# Patient Record
Sex: FEMALE | Race: WHITE | Employment: UNEMPLOYED | URBAN - METROPOLITAN AREA
[De-identification: names, ages, dates, MRNs, and addresses within clinical notes are randomized per-mention and may not be internally consistent; named-entity substitution may affect disease eponyms.]

---

## 2018-11-22 ENCOUNTER — HOSPITAL ENCOUNTER (INPATIENT)
Age: 58
LOS: 8 days | Discharge: HOME HEALTH | DRG: 133 | End: 2018-11-30
Attending: FAMILY MEDICINE | Admitting: FAMILY MEDICINE
Payer: COMMERCIAL

## 2018-11-22 DIAGNOSIS — R13.14 PHARYNGOESOPHAGEAL DYSPHAGIA: Primary | ICD-10-CM

## 2018-11-22 PROBLEM — J36 PERITONSILLAR ABSCESS: Status: ACTIVE | Noted: 2018-11-22

## 2018-11-22 LAB
ANION GAP SERPL CALCULATED.3IONS-SCNC: 13 MMOL/L (ref 9–17)
BUN BLDV-MCNC: 9 MG/DL (ref 6–20)
BUN/CREAT BLD: ABNORMAL (ref 9–20)
CALCIUM SERPL-MCNC: 9 MG/DL (ref 8.6–10.4)
CHLORIDE BLD-SCNC: 99 MMOL/L (ref 98–107)
CO2: 22 MMOL/L (ref 20–31)
CREAT SERPL-MCNC: 0.31 MG/DL (ref 0.5–0.9)
GFR AFRICAN AMERICAN: >60 ML/MIN
GFR NON-AFRICAN AMERICAN: >60 ML/MIN
GFR SERPL CREATININE-BSD FRML MDRD: ABNORMAL ML/MIN/{1.73_M2}
GFR SERPL CREATININE-BSD FRML MDRD: ABNORMAL ML/MIN/{1.73_M2}
GLUCOSE BLD-MCNC: 186 MG/DL (ref 70–99)
LACTIC ACID, SEPSIS WHOLE BLOOD: 1.1 MMOL/L (ref 0.5–1.9)
LACTIC ACID, SEPSIS: NORMAL MMOL/L (ref 0.5–1.9)
POTASSIUM SERPL-SCNC: 3.8 MMOL/L (ref 3.7–5.3)
SODIUM BLD-SCNC: 134 MMOL/L (ref 135–144)
TROPONIN INTERP: NORMAL
TROPONIN T: <0.03 NG/ML

## 2018-11-22 PROCEDURE — 84484 ASSAY OF TROPONIN QUANT: CPT

## 2018-11-22 PROCEDURE — 36415 COLL VENOUS BLD VENIPUNCTURE: CPT

## 2018-11-22 PROCEDURE — 2580000003 HC RX 258: Performed by: FAMILY MEDICINE

## 2018-11-22 PROCEDURE — 87040 BLOOD CULTURE FOR BACTERIA: CPT

## 2018-11-22 PROCEDURE — 2060000000 HC ICU INTERMEDIATE R&B

## 2018-11-22 PROCEDURE — 83605 ASSAY OF LACTIC ACID: CPT

## 2018-11-22 PROCEDURE — 99223 1ST HOSP IP/OBS HIGH 75: CPT | Performed by: FAMILY MEDICINE

## 2018-11-22 PROCEDURE — 80048 BASIC METABOLIC PNL TOTAL CA: CPT

## 2018-11-22 PROCEDURE — 87081 CULTURE SCREEN ONLY: CPT

## 2018-11-22 PROCEDURE — 6360000002 HC RX W HCPCS: Performed by: FAMILY MEDICINE

## 2018-11-22 RX ORDER — SODIUM CHLORIDE 9 MG/ML
INJECTION, SOLUTION INTRAVENOUS CONTINUOUS
Status: DISCONTINUED | OUTPATIENT
Start: 2018-11-22 | End: 2018-11-26

## 2018-11-22 RX ORDER — MORPHINE SULFATE 2 MG/ML
2 INJECTION, SOLUTION INTRAMUSCULAR; INTRAVENOUS EVERY 4 HOURS PRN
Status: DISCONTINUED | OUTPATIENT
Start: 2018-11-22 | End: 2018-11-29

## 2018-11-22 RX ORDER — SODIUM CHLORIDE 0.9 % (FLUSH) 0.9 %
10 SYRINGE (ML) INJECTION EVERY 12 HOURS SCHEDULED
Status: DISCONTINUED | OUTPATIENT
Start: 2018-11-22 | End: 2018-11-30 | Stop reason: HOSPADM

## 2018-11-22 RX ORDER — VANCOMYCIN HYDROCHLORIDE 500 MG/100ML
500 INJECTION, SOLUTION INTRAVENOUS EVERY 12 HOURS
Status: DISCONTINUED | OUTPATIENT
Start: 2018-11-22 | End: 2018-11-23

## 2018-11-22 RX ORDER — SODIUM CHLORIDE 0.9 % (FLUSH) 0.9 %
10 SYRINGE (ML) INJECTION PRN
Status: DISCONTINUED | OUTPATIENT
Start: 2018-11-22 | End: 2018-11-30 | Stop reason: HOSPADM

## 2018-11-22 RX ORDER — DEXAMETHASONE SODIUM PHOSPHATE 4 MG/ML
4 INJECTION, SOLUTION INTRA-ARTICULAR; INTRALESIONAL; INTRAMUSCULAR; INTRAVENOUS; SOFT TISSUE EVERY 6 HOURS
Status: DISCONTINUED | OUTPATIENT
Start: 2018-11-22 | End: 2018-11-23

## 2018-11-22 RX ORDER — ACETAMINOPHEN 325 MG/1
650 TABLET ORAL EVERY 4 HOURS PRN
Status: DISCONTINUED | OUTPATIENT
Start: 2018-11-22 | End: 2018-11-24

## 2018-11-22 RX ORDER — OXYCODONE HCL 5 MG/5 ML
5 SOLUTION, ORAL ORAL EVERY 4 HOURS PRN
Status: DISCONTINUED | OUTPATIENT
Start: 2018-11-22 | End: 2018-11-24

## 2018-11-22 RX ADMIN — DEXAMETHASONE SODIUM PHOSPHATE 4 MG: 4 INJECTION, SOLUTION INTRAMUSCULAR; INTRAVENOUS at 18:57

## 2018-11-22 RX ADMIN — MORPHINE SULFATE 2 MG: 2 INJECTION, SOLUTION INTRAMUSCULAR; INTRAVENOUS at 18:56

## 2018-11-22 RX ADMIN — VANCOMYCIN HYDROCHLORIDE 500 MG: 500 INJECTION, POWDER, LYOPHILIZED, FOR SOLUTION INTRAVENOUS at 23:25

## 2018-11-22 RX ADMIN — ENOXAPARIN SODIUM 40 MG: 40 INJECTION SUBCUTANEOUS at 18:58

## 2018-11-22 RX ADMIN — Medication 10 ML: at 20:21

## 2018-11-22 RX ADMIN — PIPERACILLIN AND TAZOBACTAM 3.38 G: 3; .375 INJECTION, POWDER, LYOPHILIZED, FOR SOLUTION INTRAVENOUS; PARENTERAL at 20:22

## 2018-11-22 RX ADMIN — SODIUM CHLORIDE: 9 INJECTION, SOLUTION INTRAVENOUS at 18:50

## 2018-11-22 ASSESSMENT — ENCOUNTER SYMPTOMS
SHORTNESS OF BREATH: 0
DIARRHEA: 0
BLOOD IN STOOL: 0
SINUS PRESSURE: 0
WHEEZING: 0
VOMITING: 0
NAUSEA: 0
CONSTIPATION: 0
VOICE CHANGE: 1
TROUBLE SWALLOWING: 1
ABDOMINAL PAIN: 0
SORE THROAT: 1
COUGH: 0

## 2018-11-22 ASSESSMENT — PAIN SCALES - GENERAL: PAINLEVEL_OUTOF10: 10

## 2018-11-23 ENCOUNTER — APPOINTMENT (OUTPATIENT)
Dept: GENERAL RADIOLOGY | Age: 58
DRG: 133 | End: 2018-11-23
Attending: FAMILY MEDICINE
Payer: COMMERCIAL

## 2018-11-23 ENCOUNTER — APPOINTMENT (OUTPATIENT)
Dept: CT IMAGING | Age: 58
DRG: 133 | End: 2018-11-23
Attending: FAMILY MEDICINE
Payer: COMMERCIAL

## 2018-11-23 LAB
ALBUMIN SERPL-MCNC: 3.1 G/DL (ref 3.5–5.2)
ALBUMIN/GLOBULIN RATIO: 0.9 (ref 1–2.5)
ALP BLD-CCNC: 59 U/L (ref 35–104)
ALT SERPL-CCNC: 13 U/L (ref 5–33)
ANION GAP SERPL CALCULATED.3IONS-SCNC: 14 MMOL/L (ref 9–17)
AST SERPL-CCNC: 13 U/L
BILIRUB SERPL-MCNC: 0.83 MG/DL (ref 0.3–1.2)
BUN BLDV-MCNC: 12 MG/DL (ref 6–20)
BUN/CREAT BLD: ABNORMAL (ref 9–20)
C-REACTIVE PROTEIN: 146.5 MG/L (ref 0–5)
CALCIUM IONIZED: 1.19 MMOL/L (ref 1.13–1.33)
CALCIUM SERPL-MCNC: 9.1 MG/DL (ref 8.6–10.4)
CHLORIDE BLD-SCNC: 104 MMOL/L (ref 98–107)
CO2: 22 MMOL/L (ref 20–31)
CREAT SERPL-MCNC: 0.32 MG/DL (ref 0.5–0.9)
EKG ATRIAL RATE: 102 BPM
EKG P AXIS: 82 DEGREES
EKG P-R INTERVAL: 138 MS
EKG Q-T INTERVAL: 330 MS
EKG QRS DURATION: 84 MS
EKG QTC CALCULATION (BAZETT): 430 MS
EKG R AXIS: 75 DEGREES
EKG T AXIS: 79 DEGREES
EKG VENTRICULAR RATE: 102 BPM
ESTIMATED AVERAGE GLUCOSE: 94 MG/DL
GFR AFRICAN AMERICAN: >60 ML/MIN
GFR NON-AFRICAN AMERICAN: >60 ML/MIN
GFR SERPL CREATININE-BSD FRML MDRD: ABNORMAL ML/MIN/{1.73_M2}
GFR SERPL CREATININE-BSD FRML MDRD: ABNORMAL ML/MIN/{1.73_M2}
GLUCOSE BLD-MCNC: 170 MG/DL (ref 70–99)
HBA1C MFR BLD: 4.9 % (ref 4–6)
HCT VFR BLD CALC: 38.5 % (ref 36.3–47.1)
HEMOGLOBIN: 12.7 G/DL (ref 11.9–15.1)
INR BLD: 0.9
MAGNESIUM: 2.2 MG/DL (ref 1.6–2.6)
MCH RBC QN AUTO: 33 PG (ref 25.2–33.5)
MCHC RBC AUTO-ENTMCNC: 33 G/DL (ref 28.4–34.8)
MCV RBC AUTO: 100 FL (ref 82.6–102.9)
NRBC AUTOMATED: 0 PER 100 WBC
PDW BLD-RTO: 11.8 % (ref 11.8–14.4)
PHOSPHORUS: 2.7 MG/DL (ref 2.6–4.5)
PLATELET # BLD: 202 K/UL (ref 138–453)
PMV BLD AUTO: 10.9 FL (ref 8.1–13.5)
POTASSIUM SERPL-SCNC: 3.7 MMOL/L (ref 3.7–5.3)
PROTHROMBIN TIME: 9.4 SEC (ref 9–12)
RBC # BLD: 3.85 M/UL (ref 3.95–5.11)
SODIUM BLD-SCNC: 140 MMOL/L (ref 135–144)
TOTAL PROTEIN: 6.5 G/DL (ref 6.4–8.3)
TROPONIN INTERP: NORMAL
TROPONIN T: <0.03 NG/ML
WBC # BLD: 15.7 K/UL (ref 3.5–11.3)

## 2018-11-23 PROCEDURE — 70491 CT SOFT TISSUE NECK W/DYE: CPT

## 2018-11-23 PROCEDURE — 71045 X-RAY EXAM CHEST 1 VIEW: CPT

## 2018-11-23 PROCEDURE — 2060000000 HC ICU INTERMEDIATE R&B

## 2018-11-23 PROCEDURE — 86140 C-REACTIVE PROTEIN: CPT

## 2018-11-23 PROCEDURE — 6360000004 HC RX CONTRAST MEDICATION: Performed by: FAMILY MEDICINE

## 2018-11-23 PROCEDURE — 83735 ASSAY OF MAGNESIUM: CPT

## 2018-11-23 PROCEDURE — 85610 PROTHROMBIN TIME: CPT

## 2018-11-23 PROCEDURE — 6360000002 HC RX W HCPCS: Performed by: OTOLARYNGOLOGY

## 2018-11-23 PROCEDURE — 6360000002 HC RX W HCPCS: Performed by: FAMILY MEDICINE

## 2018-11-23 PROCEDURE — 99254 IP/OBS CNSLTJ NEW/EST MOD 60: CPT | Performed by: INTERNAL MEDICINE

## 2018-11-23 PROCEDURE — 84100 ASSAY OF PHOSPHORUS: CPT

## 2018-11-23 PROCEDURE — 83036 HEMOGLOBIN GLYCOSYLATED A1C: CPT

## 2018-11-23 PROCEDURE — 93005 ELECTROCARDIOGRAM TRACING: CPT

## 2018-11-23 PROCEDURE — 82330 ASSAY OF CALCIUM: CPT

## 2018-11-23 PROCEDURE — 80053 COMPREHEN METABOLIC PANEL: CPT

## 2018-11-23 PROCEDURE — 84484 ASSAY OF TROPONIN QUANT: CPT

## 2018-11-23 PROCEDURE — 99232 SBSQ HOSP IP/OBS MODERATE 35: CPT | Performed by: FAMILY MEDICINE

## 2018-11-23 PROCEDURE — 2580000003 HC RX 258: Performed by: FAMILY MEDICINE

## 2018-11-23 PROCEDURE — 85027 COMPLETE CBC AUTOMATED: CPT

## 2018-11-23 PROCEDURE — 36415 COLL VENOUS BLD VENIPUNCTURE: CPT

## 2018-11-23 RX ORDER — DEXAMETHASONE SODIUM PHOSPHATE 10 MG/ML
10 INJECTION INTRAMUSCULAR; INTRAVENOUS EVERY 8 HOURS
Status: DISCONTINUED | OUTPATIENT
Start: 2018-11-23 | End: 2018-11-26

## 2018-11-23 RX ADMIN — SODIUM CHLORIDE: 9 INJECTION, SOLUTION INTRAVENOUS at 22:07

## 2018-11-23 RX ADMIN — Medication 10 MG: at 17:04

## 2018-11-23 RX ADMIN — Medication 10 MG: at 06:03

## 2018-11-23 RX ADMIN — DEXAMETHASONE SODIUM PHOSPHATE 4 MG: 4 INJECTION, SOLUTION INTRAMUSCULAR; INTRAVENOUS at 01:34

## 2018-11-23 RX ADMIN — Medication 10 ML: at 20:09

## 2018-11-23 RX ADMIN — IOPAMIDOL 75 ML: 755 INJECTION, SOLUTION INTRAVENOUS at 21:55

## 2018-11-23 RX ADMIN — MORPHINE SULFATE 2 MG: 2 INJECTION, SOLUTION INTRAMUSCULAR; INTRAVENOUS at 11:56

## 2018-11-23 RX ADMIN — Medication 10 ML: at 08:55

## 2018-11-23 RX ADMIN — PIPERACILLIN AND TAZOBACTAM 3.38 G: 3; .375 INJECTION, POWDER, LYOPHILIZED, FOR SOLUTION INTRAVENOUS; PARENTERAL at 20:09

## 2018-11-23 RX ADMIN — Medication 10 MG: at 20:18

## 2018-11-23 RX ADMIN — PIPERACILLIN AND TAZOBACTAM 3.38 G: 3; .375 INJECTION, POWDER, LYOPHILIZED, FOR SOLUTION INTRAVENOUS; PARENTERAL at 03:49

## 2018-11-23 RX ADMIN — VANCOMYCIN HYDROCHLORIDE 500 MG: 500 INJECTION, POWDER, LYOPHILIZED, FOR SOLUTION INTRAVENOUS at 13:17

## 2018-11-23 RX ADMIN — PIPERACILLIN AND TAZOBACTAM 3.38 G: 3; .375 INJECTION, POWDER, LYOPHILIZED, FOR SOLUTION INTRAVENOUS; PARENTERAL at 11:53

## 2018-11-23 ASSESSMENT — ENCOUNTER SYMPTOMS
CONSTIPATION: 0
VOICE CHANGE: 1
ABDOMINAL PAIN: 0
WHEEZING: 0
EYES NEGATIVE: 1
NAUSEA: 0
RESPIRATORY NEGATIVE: 1
SHORTNESS OF BREATH: 0
VOMITING: 0
BLOOD IN STOOL: 0
DIARRHEA: 0
ALLERGIC/IMMUNOLOGIC NEGATIVE: 1
SINUS PRESSURE: 0
SORE THROAT: 1
GASTROINTESTINAL NEGATIVE: 1
COUGH: 0
TROUBLE SWALLOWING: 1

## 2018-11-23 ASSESSMENT — PAIN SCALES - GENERAL: PAINLEVEL_OUTOF10: 10

## 2018-11-23 NOTE — CONSULTS
[address]                  [facility]                                  CONSULTATION    PATIENT NAME: Shwetha Franco                 :        1960  MED REC NO:   [id number]                             ROOM:       9691  ACCOUNT NO:   [de-identified]                           ADMIT DATE: 2018  PROVIDER:     Abhilash Media    CONSULT DATE:  2018    LOCATION:  431, bed-1. HISTORY OF PRESENT ILLNESS:  This is a 70-year-old who was transferred  from [facility] one day prior to this dictation with a  suspected retropharyngeal abscess. The reviewers refer to the chart for definitive details. The patient admits to a now 12-day history of throat pain. She was  evaluated by an urgent care center. Throat swab at that time was  reportedly negative. Amoxicillin was prescribed. Unfortunately, throat  pain worsened to that include, dysphagia, odynophagia, voice changes  along with neck pain worsened with neck movement. The patient presented to [facility] one day prior to this  dictation. White blood cell count was elevated at 17.8. Strep screen  was negative. CT neck with contrast was obtained. No images were  available for review. This reported very extensive, very thick  prevertebral apparent abscess. Lower most extension was at the mid  thyroid. More fluid, however, was present inferiorly, adjacent to the  carotid arteries and lower thyroid gland anteriorly. The patient was transferred for definitive care. Zosyn and vancomycin  were initiated in addition to Decadron. Overall, the patient admits to  marked improvement in her swallow function, throat and neck pain. She  denies respiratory issues throughout her illness. PAST MEDICAL HISTORY:  None. PAST SURGICAL HISTORY:  None recorded. SOCIAL HISTORY:  She admits to drinking beer twice weekly. Tobacco  abuse.     HOME MEDICATIONS:  None. VITAL SIGNS:  Afebrile. Heart rate 100, respiratory rate 21, blood  pressure 152/59, O2 saturation 96% on room air, weight 102 pounds,  maximum temperature 99.0. Single-view chest x-ray, 11/23/2018, was unremarkable. PRESENT MEDICATIONS:  Include, but may not be limited to, Decadron,  Lovenox, Zosyn, vancomycin. BLOOD WORK:  11/23/2018, white blood cell count 15.7. Throat culture is  pending. Blood cultures x2 are no growth to date. PT/INR within normal  limits. PHYSICAL EXAMINATION:  Bedside exam was performed with the Department of  Nursing in attendance. This revealed a 53-year-old. She was in no  acute distress. Her voice was strong and intact, grossly unremarkable. There was no respiratory distress or stridor. She is unwilling/unable  to swallow her own secretions and requires frequent suctioning, spiting  into a tissue. Facial exam was unremarkable. Neck exam visually was  remarkable. Palpation of the neck was grossly unremarkable. The neck  was nontender with manipulation. Bilateral ear exam was unremarkable. Anterior rhinoscopy revealed septal deviation to the left. There was  mild pale mucosal edema with mucoid rhinorrhea, but without mass, polyp,  or mucopurulence. Oral exam revealed minimal trismus only. Tongue and  floor of mouth were unremarkable. There was significant pooling of  secretions in the oropharynx. There was suggestion of fullness about  the right posterior oropharynx. PROCEDURE:  Flexible laryngoscopy was performed at the bedside. The  patient was placed in the semirecumbent position. Department of Nursing  was in attendance. Verbal consent was obtained. Anterior rhinoscopy  revealed septal deviation to the left. A lubricated flexible  laryngoscope was easily passed in the right nasal airway. This revealed  mild-to-moderate pale mucosal edema with mild-to-moderate mucoid  rhinorrhea. The nasopharynx was grossly unremarkable. There was subtle  fullness about the right aspect. Oropharyngeal and hypopharyngeal exam  suggested fullness about the right aspect. Laryngeal exam was  unremarkable. There was no vocal cord anatomy and function. The  supraglottic larynx was unremarkable. There was no edema. The  laryngeal airway was widely patent. There was moderate pooling of clear  salivary secretions throughout the hypopharynx. The endoscope was then  slowly and gently removed. This completed the procedures. The patient  tolerated the procedure without undue discomfort or complication. IMPRESSION:  Apparent retropharyngeal fluid collection/abscess per  outside CT neck with contrast.  Unfortunately, outside images are not  available for review. Further, disease dimensions are not recorded in  the outside report. The patient does admit to improvement post admission with IV antibiotic  and limited Decadron therapy. Presently, she is in no acute distress. No urgent intervention is required. On Zosyn, vancomycin, recommend infectious disease consultation. We  will also increase Decadron to 10 mg per IV q.8 hours. Monitored bed is required with continuous pulse oximetry. Continue  n.p.o. status. If there is no significant continued improvement through the day, repeat  CT neck with contrast this evening will be required. The patient appeared to understand these plans and considerations. She  understands the potential need for incision and drainage in the  operating room setting under general anesthesia as directed by her  clinical course. All questions were answered. The above was discussed with the Department of Nursing. We will  continue to follow the patient closely with you. Please do not hesitate  to contact myself with any specific questions regarding the above.         Lolly Carter    D: 11/23/2018 11:00:52       T: 11/23/2018 12:18:12     [id number]  Job#: [id number]     Doc#: [id number]    CC:

## 2018-11-23 NOTE — CONSULTS
today is 15. We are consulted for management and antibiotics    Interval ofzstdk8311/23/2018   At this time. She has stiff neck, quite a bit of neck pain but improved compared to before. Continues to be sore to the throat, but her voice is back to normal.  She is hard to hear, which is her baseline. White count is 15, probably because of the dose of steroids given in the ER before her transfer to [facility]  Summary of relevant labs:  Labs:    Micro:    Imaging:      I have personally reviewed the past medical history, past surgical history, medications, social history, and family history, and I haveupdated the database accordingly. Past Medical History:   History reviewed. No pertinent past medical history. Past Surgical  History:   History reviewed. No pertinent surgical history. Medications:      dexamethasone  10 mg Intravenous Q8H    sodium chloride flush  10 mL Intravenous 2 times per day    piperacillin-tazobactam  3.375 g Intravenous Q8H    vancomycin  500 mg Intravenous Q12H    vancomycin (VANCOCIN) intermittent dosing (placeholder)   Other RX Placeholder       Social History:     Social History     Social History    Marital status:      Spouse name: N/A    Number of children: N/A    Years of education: N/A     Occupational History    Not on file. Social History Main Topics    Smoking status: Current Every Day Smoker    Smokeless tobacco: Never Used    Alcohol use 1.8 oz/week     3 Cans of beer per week    Drug use: No    Sexual activity: Not on file     Other Topics Concern    Not on file     Social History Narrative    No narrative on file       Family History:   No family history on file. Allergies:   Patient has no known allergies. Review of Systems:     Review of Systems   Constitutional: Negative. HENT: Positive for sore throat. Eyes: Negative. Respiratory: Negative. Cardiovascular: Negative. Gastrointestinal: Negative.     Endocrine:

## 2018-11-23 NOTE — PROGRESS NOTES
Physical Therapy  DATE: 2018    NAME: Dunia Cervantes  MRN: [id number]   : 1960    Patient not seen this date for Physical Therapy due to:  [] Blood transfusion in progress  [] Hemodialysis  []  Patient Declined  [] Spine Precautions   [x] Strict Bedrest - confirmed with RN per OT   [] Surgery/ Procedure  [] Testing      [] Other        [] PT being discontinued at this time. Patient independent. No further needs. [] PT being discontinued at this time as the patient has been transferred to palliative care. No further needs. ZAN Bustamante   Evaluation/treatment performed by Student PT under the supervision of co-signing PT who agrees with all evaluation/treatment and documentation.

## 2018-11-23 NOTE — CARE COORDINATION
Case Management Initial Discharge Plan  Trey Gutierrez,             Met with:patient to discuss discharge plans. Information verified: address, contacts, phone number, , insurance Yes  PCP: Ross Hannah DO  Date of last visit: years ago. Went to an urgent care for her current issue    Insurance Provider: Sue    Discharge Planning    Living Arrangements:  Spouse/Significant Other   Support Systems:  Spouse/Significant Other, Children    Home has 1 stories  0 stairs to climb to get into front door, n/a stairs to climb to reach second floor  Location of bedroom/bathroom in home main    Patient able to perform ADL's:Independent    Current Services (outpatient & in home) none  DME equipment: none  DME provider: none    Pharmacy: [facility]   Potential Assistance Purchasing Medications:  No  Does patient want to participate in local refill/ meds to beds program?  Yes    Potential Assistance Needed:  N/A    Patient agreeable to home care: No  [place] of choice provided:  n/a    Prior SNF/Rehab Placement and Facility: none  Agreeable to SNF/Rehab: No  [place] of choice provided: n/a   Evaluation: n/a    Expected Discharge date:  18  Patient expects to be discharged to:  home  Follow Up Appointment: Best Day/ Time: Monday AM    Transportation provider: spouse  Transportation arrangements needed for discharge: No    Readmission Risk              Risk of Unplanned Readmission:        8             Does patient have a readmission risk score greater than 14?: No  If yes, follow-up appointment must be made within 7 days of discharge.      Discharge Plan: home independent          Electronically signed by Ankita Barrientos RN on 18 at 11:27 AM

## 2018-11-23 NOTE — PROGRESS NOTES
Patient seen, examined, including bedside flexible laryngoscopy  Chart reviewed  Consult dictated    A/P: Apparent retropharyngeal fluid collection/abscess per outside CT neck with contrast            - unfortunately, outside images are not available for review and disease dimensions are not recorded in the outside report            -  Admits to improvement post-admission. Presently, in no distress                    no urgent intervention required            - on Zosyn, Vancomycin                    recommend ID consultation            - begin Decadron 10mg IV q8            - monitored bed, continuous pulse oximetry            - npo            - if no significant improvement through the day, will need repeat CT neck with contrast this evening            - Mrs. Barbra Ramirez appeared to understand these plans and recommendation. She understands the potential need for I&D in the OR under general anesthesia, as per her clinical course            - d/w Nursing. We will continue to follow Mrs. Barbra Ramirez closely with you.  Please call with any questions

## 2018-11-23 NOTE — PROGRESS NOTES
mg Intravenous Q8H   sodium chloride flush 10 mL Intravenous 2 times per day   enoxaparin 40 mg Subcutaneous Daily   piperacillin-tazobactam 3.375 g Intravenous Q8H   vancomycin 500 mg Intravenous Q12H   dexamethasone 4 mg Intravenous Q6H   vancomycin (VANCOCIN) intermittent dosing (placeholder)  Other RX Placeholder       Review of Systems   Review of Systems   Constitutional: Negative for activity change, appetite change, chills, fatigue, fever and unexpected weight change. HENT: Positive for sore throat, trouble swallowing and voice change. Negative for congestion, mouth sores, postnasal drip and sinus pressure. Eyes: Negative for visual disturbance. Respiratory: Negative for cough, shortness of breath and wheezing. Cardiovascular: Negative for chest pain and palpitations. Gastrointestinal: Negative for abdominal pain, blood in stool, constipation, diarrhea, nausea and vomiting. Endocrine: Negative for polyuria. Genitourinary: Negative for difficulty urinating, dysuria, frequency and urgency. Musculoskeletal: Negative for arthralgias, joint swelling and myalgias. Neurological: Negative for dizziness, tremors, speech difficulty, light-headedness and headaches.      Objective :      Current Vitals : Temp: 98.4 °F (36.9 °C),  Pulse: 112, Resp: 21, BP: (!) 125/55, SpO2: 99 %  Last 24 Hrs Vitals   Patient Vitals for the past 24 hrs:   BP Temp Temp src Pulse Resp SpO2 Height Weight   11/23/18 0600 - - - - - - - 102 lb 11.8 oz (46.6 kg)   11/23/18 0345 (!) 125/55 98.4 °F (36.9 °C) Oral 112 21 - - -   11/22/18 2330 (!) 110/51 99 °F (37.2 °C) Oral 95 28 - - -   11/22/18 1949 (!) 130/40 98.9 °F (37.2 °C) Oral 99 28 99 % - -   11/22/18 1830 - - - - - - 5' 5\" (1.651 m) 110 lb 0.2 oz (49.9 kg)   11/22/18 1745 (!) 130/46 97.4 °F (36.3 °C) Oral 122 18 98 % - -     Intake / output   11/22 0701 - 11/23 0700  In: 1137.2 [P.O.:30; I.V.:1107.2]  Out: -   Physical Exam:  Physical Exam   Constitutional: She is oriented to person, place, and time. She appears well-developed and well-nourished. No distress. HENT:   Mouth/Throat: No oropharyngeal exudate. Eyes: Pupils are equal, round, and reactive to light. Conjunctivae are normal. No scleral icterus. Neck: No JVD present. Muscular tenderness present. Edema present. No thyromegaly present. Cardiovascular: Normal rate, regular rhythm and normal heart sounds. Exam reveals no gallop and no friction rub. No murmur heard. Pulmonary/Chest: Effort normal. No respiratory distress. She has no wheezes. She has no rales. Abdominal: Soft. Bowel sounds are normal. She exhibits no distension and no mass. There is no tenderness. There is no rebound and no guarding. Lymphadenopathy:        Head (right side): Submandibular adenopathy present. She has no cervical adenopathy. Neurological: She is alert and oriented to person, place, and time. No cranial nerve deficit. She exhibits normal muscle tone. Skin: She is not diaphoretic. Nursing note and vitals reviewed. Lower Extremities : No ankle Edema , No calf Tenderness     Laboratory findings:    Recent Labs      11/23/18   0556   WBC  15.7*   HGB  12.7   HCT  38.5   PLT  202   INR  0.9     Recent Labs      11/22/18   2232  11/23/18   0556   NA  134*  140   K  3.8  3.7   CL  99  104   CO2  22  22   GLUCOSE  186*  170*   BUN  9  12   CREATININE  0.31*  0.32*   MG   --   2.2   CALCIUM  9.0  9.1   CAION   --   1.19   PHOS   --   2.7     Recent Labs      11/23/18   0556   PROT  6.5   LABALBU  3.1*   AST  13   ALT  13   ALKPHOS  59   BILITOT  0.83          No results found for: Clare Rodney, RBCUA, BLOODU, BACTERIA, NITRU, WBCUA, LEUKOCYTESUR    Imaging/Diagonstics:     CT neck 11/22/18   Very  extensive very thick prevertebral heparin abscess. Warm most extent of prevertebral material at the mid thyroid level or so.   Though more fluid is present in inferior to the edges sent to the carotid arteries and lower

## 2018-11-24 ENCOUNTER — APPOINTMENT (OUTPATIENT)
Dept: GENERAL RADIOLOGY | Age: 58
DRG: 133 | End: 2018-11-24
Attending: FAMILY MEDICINE
Payer: COMMERCIAL

## 2018-11-24 ENCOUNTER — ANESTHESIA EVENT (OUTPATIENT)
Dept: OPERATING ROOM | Age: 58
DRG: 133 | End: 2018-11-24
Payer: COMMERCIAL

## 2018-11-24 ENCOUNTER — ANESTHESIA (OUTPATIENT)
Dept: OPERATING ROOM | Age: 58
DRG: 133 | End: 2018-11-24
Payer: COMMERCIAL

## 2018-11-24 VITALS
DIASTOLIC BLOOD PRESSURE: 43 MMHG | TEMPERATURE: 89.4 F | OXYGEN SATURATION: 99 % | RESPIRATION RATE: 8 BRPM | SYSTOLIC BLOOD PRESSURE: 84 MMHG

## 2018-11-24 LAB
ABSOLUTE EOS #: 0 K/UL (ref 0–0.4)
ABSOLUTE IMMATURE GRANULOCYTE: 0 K/UL (ref 0–0.3)
ABSOLUTE LYMPH #: 0.78 K/UL (ref 1–4.8)
ABSOLUTE MONO #: 1.3 K/UL (ref 0.1–0.8)
ALLEN TEST: NEGATIVE
ANION GAP SERPL CALCULATED.3IONS-SCNC: 12 MMOL/L (ref 9–17)
BASOPHILS # BLD: 0 % (ref 0–2)
BASOPHILS ABSOLUTE: 0 K/UL (ref 0–0.2)
BUN BLDV-MCNC: 22 MG/DL (ref 6–20)
BUN/CREAT BLD: ABNORMAL (ref 9–20)
C-REACTIVE PROTEIN: 87.3 MG/L (ref 0–5)
CALCIUM SERPL-MCNC: 9 MG/DL (ref 8.6–10.4)
CHLORIDE BLD-SCNC: 109 MMOL/L (ref 98–107)
CO2: 20 MMOL/L (ref 20–31)
CREAT SERPL-MCNC: 0.39 MG/DL (ref 0.5–0.9)
CULTURE: NORMAL
CULTURE: NORMAL
DIFFERENTIAL TYPE: ABNORMAL
EOSINOPHILS RELATIVE PERCENT: 0 % (ref 1–4)
FIO2: 40
GFR AFRICAN AMERICAN: >60 ML/MIN
GFR NON-AFRICAN AMERICAN: >60 ML/MIN
GFR SERPL CREATININE-BSD FRML MDRD: ABNORMAL ML/MIN/{1.73_M2}
GFR SERPL CREATININE-BSD FRML MDRD: ABNORMAL ML/MIN/{1.73_M2}
GLUCOSE BLD-MCNC: 145 MG/DL (ref 65–105)
GLUCOSE BLD-MCNC: 153 MG/DL (ref 70–99)
GLUCOSE BLD-MCNC: 181 MG/DL (ref 65–105)
HCT VFR BLD CALC: 37.4 % (ref 36.3–47.1)
HEMOGLOBIN: 12.2 G/DL (ref 11.9–15.1)
IMMATURE GRANULOCYTES: 0 %
LACTIC ACID, WHOLE BLOOD: 1.8 MMOL/L (ref 0.7–2.1)
LYMPHOCYTES # BLD: 6 % (ref 24–44)
Lab: NORMAL
MCH RBC QN AUTO: 33.8 PG (ref 25.2–33.5)
MCHC RBC AUTO-ENTMCNC: 32.6 G/DL (ref 28.4–34.8)
MCV RBC AUTO: 103.6 FL (ref 82.6–102.9)
MODE: ABNORMAL
MONOCYTES # BLD: 10 % (ref 1–7)
MORPHOLOGY: ABNORMAL
NEGATIVE BASE EXCESS, ART: ABNORMAL (ref 0–2)
NRBC AUTOMATED: 0 PER 100 WBC
O2 DEVICE/FLOW/%: ABNORMAL
PATIENT TEMP: ABNORMAL
PDW BLD-RTO: 12 % (ref 11.8–14.4)
PLATELET # BLD: 217 K/UL (ref 138–453)
PLATELET ESTIMATE: ABNORMAL
PMV BLD AUTO: 11.7 FL (ref 8.1–13.5)
POC HCO3: 25.8 MMOL/L (ref 21–28)
POC O2 SATURATION: 98 % (ref 94–98)
POC PCO2 TEMP: ABNORMAL MM HG
POC PCO2: 42.9 MM HG (ref 35–48)
POC PH TEMP: ABNORMAL
POC PH: 7.39 (ref 7.35–7.45)
POC PO2 TEMP: ABNORMAL MM HG
POC PO2: 115.4 MM HG (ref 83–108)
POSITIVE BASE EXCESS, ART: 1 (ref 0–3)
POTASSIUM SERPL-SCNC: 4.4 MMOL/L (ref 3.7–5.3)
RBC # BLD: 3.61 M/UL (ref 3.95–5.11)
RBC # BLD: ABNORMAL 10*6/UL
SAMPLE SITE: ABNORMAL
SEG NEUTROPHILS: 84 % (ref 36–66)
SEGMENTED NEUTROPHILS ABSOLUTE COUNT: 10.92 K/UL (ref 1.8–7.7)
SODIUM BLD-SCNC: 141 MMOL/L (ref 135–144)
SPECIMEN DESCRIPTION: NORMAL
STATUS: NORMAL
TCO2 (CALC), ART: 27 MMOL/L (ref 22–29)
VANCOMYCIN TROUGH DATE LAST DOSE: ABNORMAL
VANCOMYCIN TROUGH DOSE AMOUNT: ABNORMAL
VANCOMYCIN TROUGH TIME LAST DOSE: ABNORMAL
VANCOMYCIN TROUGH: <4 UG/ML (ref 10–20)
WBC # BLD: 13 K/UL (ref 3.5–11.3)
WBC # BLD: ABNORMAL 10*3/UL

## 2018-11-24 PROCEDURE — 86140 C-REACTIVE PROTEIN: CPT

## 2018-11-24 PROCEDURE — 2580000003 HC RX 258: Performed by: OTOLARYNGOLOGY

## 2018-11-24 PROCEDURE — 94002 VENT MGMT INPAT INIT DAY: CPT

## 2018-11-24 PROCEDURE — 3600000014 HC SURGERY LEVEL 4 ADDTL 15MIN: Performed by: OTOLARYNGOLOGY

## 2018-11-24 PROCEDURE — 2700000000 HC OXYGEN THERAPY PER DAY

## 2018-11-24 PROCEDURE — 0C9M8ZZ DRAINAGE OF PHARYNX, VIA NATURAL OR ARTIFICIAL OPENING ENDOSCOPIC: ICD-10-PCS | Performed by: OTOLARYNGOLOGY

## 2018-11-24 PROCEDURE — 71046 X-RAY EXAM CHEST 2 VIEWS: CPT

## 2018-11-24 PROCEDURE — 87070 CULTURE OTHR SPECIMN AEROBIC: CPT

## 2018-11-24 PROCEDURE — 87077 CULTURE AEROBIC IDENTIFY: CPT

## 2018-11-24 PROCEDURE — 2000000000 HC ICU R&B

## 2018-11-24 PROCEDURE — 6370000000 HC RX 637 (ALT 250 FOR IP): Performed by: NURSE PRACTITIONER

## 2018-11-24 PROCEDURE — 87186 SC STD MICRODIL/AGAR DIL: CPT

## 2018-11-24 PROCEDURE — 2500000003 HC RX 250 WO HCPCS: Performed by: NURSE ANESTHETIST, CERTIFIED REGISTERED

## 2018-11-24 PROCEDURE — 6360000002 HC RX W HCPCS: Performed by: OTOLARYNGOLOGY

## 2018-11-24 PROCEDURE — 87205 SMEAR GRAM STAIN: CPT

## 2018-11-24 PROCEDURE — 94770 HC ETCO2 MONITOR DAILY: CPT

## 2018-11-24 PROCEDURE — 6360000002 HC RX W HCPCS: Performed by: INTERNAL MEDICINE

## 2018-11-24 PROCEDURE — 6370000000 HC RX 637 (ALT 250 FOR IP): Performed by: INTERNAL MEDICINE

## 2018-11-24 PROCEDURE — 83605 ASSAY OF LACTIC ACID: CPT

## 2018-11-24 PROCEDURE — 6360000002 HC RX W HCPCS: Performed by: FAMILY MEDICINE

## 2018-11-24 PROCEDURE — 2580000003 HC RX 258: Performed by: NURSE ANESTHETIST, CERTIFIED REGISTERED

## 2018-11-24 PROCEDURE — 2709999900 HC NON-CHARGEABLE SUPPLY: Performed by: OTOLARYNGOLOGY

## 2018-11-24 PROCEDURE — 80048 BASIC METABOLIC PNL TOTAL CA: CPT

## 2018-11-24 PROCEDURE — 3700000000 HC ANESTHESIA ATTENDED CARE: Performed by: OTOLARYNGOLOGY

## 2018-11-24 PROCEDURE — 6370000000 HC RX 637 (ALT 250 FOR IP): Performed by: OTOLARYNGOLOGY

## 2018-11-24 PROCEDURE — 94762 N-INVAS EAR/PLS OXIMTRY CONT: CPT

## 2018-11-24 PROCEDURE — 85025 COMPLETE CBC W/AUTO DIFF WBC: CPT

## 2018-11-24 PROCEDURE — 0CJS8ZZ INSPECTION OF LARYNX, VIA NATURAL OR ARTIFICIAL OPENING ENDOSCOPIC: ICD-10-PCS | Performed by: OTOLARYNGOLOGY

## 2018-11-24 PROCEDURE — 2580000003 HC RX 258: Performed by: INTERNAL MEDICINE

## 2018-11-24 PROCEDURE — 2580000003 HC RX 258: Performed by: FAMILY MEDICINE

## 2018-11-24 PROCEDURE — 71045 X-RAY EXAM CHEST 1 VIEW: CPT

## 2018-11-24 PROCEDURE — 2500000003 HC RX 250 WO HCPCS: Performed by: INTERNAL MEDICINE

## 2018-11-24 PROCEDURE — 82803 BLOOD GASES ANY COMBINATION: CPT

## 2018-11-24 PROCEDURE — 99232 SBSQ HOSP IP/OBS MODERATE 35: CPT | Performed by: INTERNAL MEDICINE

## 2018-11-24 PROCEDURE — 99232 SBSQ HOSP IP/OBS MODERATE 35: CPT | Performed by: FAMILY MEDICINE

## 2018-11-24 PROCEDURE — 87075 CULTR BACTERIA EXCEPT BLOOD: CPT

## 2018-11-24 PROCEDURE — 3700000001 HC ADD 15 MINUTES (ANESTHESIA): Performed by: OTOLARYNGOLOGY

## 2018-11-24 PROCEDURE — 80202 ASSAY OF VANCOMYCIN: CPT

## 2018-11-24 PROCEDURE — 82947 ASSAY GLUCOSE BLOOD QUANT: CPT

## 2018-11-24 PROCEDURE — 36415 COLL VENOUS BLD VENIPUNCTURE: CPT

## 2018-11-24 PROCEDURE — S0028 INJECTION, FAMOTIDINE, 20 MG: HCPCS | Performed by: INTERNAL MEDICINE

## 2018-11-24 PROCEDURE — 6360000002 HC RX W HCPCS: Performed by: NURSE ANESTHETIST, CERTIFIED REGISTERED

## 2018-11-24 PROCEDURE — 87641 MR-STAPH DNA AMP PROBE: CPT

## 2018-11-24 PROCEDURE — 94640 AIRWAY INHALATION TREATMENT: CPT

## 2018-11-24 PROCEDURE — 3600000004 HC SURGERY LEVEL 4 BASE: Performed by: OTOLARYNGOLOGY

## 2018-11-24 RX ORDER — DEXAMETHASONE SODIUM PHOSPHATE 10 MG/ML
INJECTION INTRAMUSCULAR; INTRAVENOUS PRN
Status: DISCONTINUED | OUTPATIENT
Start: 2018-11-24 | End: 2018-11-24 | Stop reason: SDUPTHER

## 2018-11-24 RX ORDER — MAGNESIUM HYDROXIDE 1200 MG/15ML
LIQUID ORAL CONTINUOUS PRN
Status: COMPLETED | OUTPATIENT
Start: 2018-11-24 | End: 2018-11-24

## 2018-11-24 RX ORDER — PROPOFOL 10 MG/ML
INJECTION, EMULSION INTRAVENOUS CONTINUOUS PRN
Status: DISCONTINUED | OUTPATIENT
Start: 2018-11-24 | End: 2018-11-24 | Stop reason: SDUPTHER

## 2018-11-24 RX ORDER — FENTANYL CITRATE 50 UG/ML
50 INJECTION, SOLUTION INTRAMUSCULAR; INTRAVENOUS EVERY 5 MIN PRN
Status: DISCONTINUED | OUTPATIENT
Start: 2018-11-24 | End: 2018-11-24 | Stop reason: HOSPADM

## 2018-11-24 RX ORDER — DEXTROSE MONOHYDRATE 25 G/50ML
12.5 INJECTION, SOLUTION INTRAVENOUS PRN
Status: DISCONTINUED | OUTPATIENT
Start: 2018-11-24 | End: 2018-11-30 | Stop reason: HOSPADM

## 2018-11-24 RX ORDER — PROPOFOL 10 MG/ML
10 INJECTION, EMULSION INTRAVENOUS
Status: DISCONTINUED | OUTPATIENT
Start: 2018-11-24 | End: 2018-11-25

## 2018-11-24 RX ORDER — PROPOFOL 10 MG/ML
INJECTION, EMULSION INTRAVENOUS PRN
Status: DISCONTINUED | OUTPATIENT
Start: 2018-11-24 | End: 2018-11-24 | Stop reason: SDUPTHER

## 2018-11-24 RX ORDER — FENTANYL CITRATE 50 UG/ML
25 INJECTION, SOLUTION INTRAMUSCULAR; INTRAVENOUS EVERY 5 MIN PRN
Status: DISCONTINUED | OUTPATIENT
Start: 2018-11-24 | End: 2018-11-24 | Stop reason: HOSPADM

## 2018-11-24 RX ORDER — FENTANYL CITRATE 50 UG/ML
INJECTION, SOLUTION INTRAMUSCULAR; INTRAVENOUS PRN
Status: DISCONTINUED | OUTPATIENT
Start: 2018-11-24 | End: 2018-11-24 | Stop reason: SDUPTHER

## 2018-11-24 RX ORDER — GLYCOPYRROLATE 1 MG/5 ML
SYRINGE (ML) INTRAVENOUS PRN
Status: DISCONTINUED | OUTPATIENT
Start: 2018-11-24 | End: 2018-11-24 | Stop reason: SDUPTHER

## 2018-11-24 RX ORDER — LIDOCAINE HYDROCHLORIDE 10 MG/ML
INJECTION, SOLUTION EPIDURAL; INFILTRATION; INTRACAUDAL; PERINEURAL PRN
Status: DISCONTINUED | OUTPATIENT
Start: 2018-11-24 | End: 2018-11-24 | Stop reason: SDUPTHER

## 2018-11-24 RX ORDER — 0.9 % SODIUM CHLORIDE 0.9 %
500 INTRAVENOUS SOLUTION INTRAVENOUS ONCE
Status: COMPLETED | OUTPATIENT
Start: 2018-11-24 | End: 2018-11-24

## 2018-11-24 RX ORDER — ONDANSETRON 2 MG/ML
INJECTION INTRAMUSCULAR; INTRAVENOUS PRN
Status: DISCONTINUED | OUTPATIENT
Start: 2018-11-24 | End: 2018-11-24 | Stop reason: SDUPTHER

## 2018-11-24 RX ORDER — GINSENG 100 MG
CAPSULE ORAL PRN
Status: DISCONTINUED | OUTPATIENT
Start: 2018-11-24 | End: 2018-11-24 | Stop reason: HOSPADM

## 2018-11-24 RX ORDER — CHLORHEXIDINE GLUCONATE 0.12 MG/ML
15 RINSE ORAL 2 TIMES DAILY
Status: DISCONTINUED | OUTPATIENT
Start: 2018-11-24 | End: 2018-11-25

## 2018-11-24 RX ORDER — ROCURONIUM BROMIDE 10 MG/ML
INJECTION, SOLUTION INTRAVENOUS PRN
Status: DISCONTINUED | OUTPATIENT
Start: 2018-11-24 | End: 2018-11-24 | Stop reason: SDUPTHER

## 2018-11-24 RX ORDER — DEXTROSE MONOHYDRATE 50 MG/ML
100 INJECTION, SOLUTION INTRAVENOUS PRN
Status: DISCONTINUED | OUTPATIENT
Start: 2018-11-24 | End: 2018-11-30 | Stop reason: HOSPADM

## 2018-11-24 RX ORDER — SODIUM CHLORIDE, SODIUM LACTATE, POTASSIUM CHLORIDE, CALCIUM CHLORIDE 600; 310; 30; 20 MG/100ML; MG/100ML; MG/100ML; MG/100ML
INJECTION, SOLUTION INTRAVENOUS CONTINUOUS PRN
Status: DISCONTINUED | OUTPATIENT
Start: 2018-11-24 | End: 2018-11-24 | Stop reason: SDUPTHER

## 2018-11-24 RX ORDER — NICOTINE POLACRILEX 4 MG
15 LOZENGE BUCCAL PRN
Status: DISCONTINUED | OUTPATIENT
Start: 2018-11-24 | End: 2018-11-30 | Stop reason: HOSPADM

## 2018-11-24 RX ADMIN — VANCOMYCIN HYDROCHLORIDE 750 MG: 10 INJECTION, POWDER, LYOPHILIZED, FOR SOLUTION INTRAVENOUS at 19:49

## 2018-11-24 RX ADMIN — ONDANSETRON 4 MG: 2 INJECTION, SOLUTION INTRAMUSCULAR; INTRAVENOUS at 11:48

## 2018-11-24 RX ADMIN — Medication 10 MG: at 06:15

## 2018-11-24 RX ADMIN — SODIUM CHLORIDE: 9 INJECTION, SOLUTION INTRAVENOUS at 18:45

## 2018-11-24 RX ADMIN — CHLORHEXIDINE GLUCONATE 0.12% ORAL RINSE 15 ML: 1.2 LIQUID ORAL at 15:38

## 2018-11-24 RX ADMIN — FAMOTIDINE 20 MG: 10 INJECTION, SOLUTION INTRAVENOUS at 19:45

## 2018-11-24 RX ADMIN — Medication 0.2 MG: at 11:25

## 2018-11-24 RX ADMIN — MORPHINE SULFATE 2 MG: 2 INJECTION, SOLUTION INTRAMUSCULAR; INTRAVENOUS at 18:39

## 2018-11-24 RX ADMIN — FENTANYL CITRATE 100 MCG: 50 INJECTION INTRAMUSCULAR; INTRAVENOUS at 11:25

## 2018-11-24 RX ADMIN — PROPOFOL 120 MG: 10 INJECTION, EMULSION INTRAVENOUS at 11:25

## 2018-11-24 RX ADMIN — Medication 10 MG: at 22:16

## 2018-11-24 RX ADMIN — SODIUM CHLORIDE, POTASSIUM CHLORIDE, SODIUM LACTATE AND CALCIUM CHLORIDE: 600; 310; 30; 20 INJECTION, SOLUTION INTRAVENOUS at 11:18

## 2018-11-24 RX ADMIN — PROPOFOL 50 MCG/KG/MIN: 10 INJECTION, EMULSION INTRAVENOUS at 13:06

## 2018-11-24 RX ADMIN — INSULIN LISPRO 2 UNITS: 100 INJECTION, SOLUTION INTRAVENOUS; SUBCUTANEOUS at 22:16

## 2018-11-24 RX ADMIN — SODIUM CHLORIDE: 9 INJECTION, SOLUTION INTRAVENOUS at 14:16

## 2018-11-24 RX ADMIN — Medication 10 ML: at 19:44

## 2018-11-24 RX ADMIN — LIDOCAINE HYDROCHLORIDE 50 MG: 10 INJECTION, SOLUTION EPIDURAL; INFILTRATION; INTRACAUDAL; PERINEURAL at 11:25

## 2018-11-24 RX ADMIN — INSULIN LISPRO 2 UNITS: 100 INJECTION, SOLUTION INTRAVENOUS; SUBCUTANEOUS at 18:34

## 2018-11-24 RX ADMIN — SODIUM CHLORIDE 500 ML: 9 INJECTION, SOLUTION INTRAVENOUS at 15:38

## 2018-11-24 RX ADMIN — PROPOFOL 25 MCG/KG/MIN: 10 INJECTION, EMULSION INTRAVENOUS at 16:12

## 2018-11-24 RX ADMIN — PIPERACILLIN AND TAZOBACTAM 3.38 G: 3; .375 INJECTION, POWDER, LYOPHILIZED, FOR SOLUTION INTRAVENOUS; PARENTERAL at 04:03

## 2018-11-24 RX ADMIN — ROCURONIUM BROMIDE 50 MG: 10 INJECTION INTRAVENOUS at 11:25

## 2018-11-24 RX ADMIN — Medication 10 MG: at 15:37

## 2018-11-24 RX ADMIN — RACEPINEPHRINE HYDROCHLORIDE 11.25 MG: 11.25 SOLUTION RESPIRATORY (INHALATION) at 03:25

## 2018-11-24 RX ADMIN — PIPERACILLIN AND TAZOBACTAM 3.38 G: 3; .375 INJECTION, POWDER, LYOPHILIZED, FOR SOLUTION INTRAVENOUS; PARENTERAL at 18:35

## 2018-11-24 RX ADMIN — MORPHINE SULFATE 2 MG: 2 INJECTION, SOLUTION INTRAMUSCULAR; INTRAVENOUS at 15:43

## 2018-11-24 RX ADMIN — ROCURONIUM BROMIDE 25 MG: 10 INJECTION INTRAVENOUS at 12:30

## 2018-11-24 RX ADMIN — DEXAMETHASONE SODIUM PHOSPHATE 10 MG: 10 INJECTION INTRAMUSCULAR; INTRAVENOUS at 11:40

## 2018-11-24 ASSESSMENT — PULMONARY FUNCTION TESTS
PIF_VALUE: 14
PIF_VALUE: 15
PIF_VALUE: 14
PIF_VALUE: 15
PIF_VALUE: 22
PIF_VALUE: 15
PIF_VALUE: 14
PIF_VALUE: 15
PIF_VALUE: 14
PIF_VALUE: 15
PIF_VALUE: 15
PIF_VALUE: 16
PIF_VALUE: 15
PIF_VALUE: 14
PIF_VALUE: 14
PIF_VALUE: 0
PIF_VALUE: 0
PIF_VALUE: 15
PIF_VALUE: 15
PIF_VALUE: 14
PIF_VALUE: 16
PIF_VALUE: 14
PIF_VALUE: 14
PIF_VALUE: 31
PIF_VALUE: 14
PIF_VALUE: 14
PIF_VALUE: 15
PIF_VALUE: 7
PIF_VALUE: 14
PIF_VALUE: 15
PIF_VALUE: 15
PIF_VALUE: 16
PIF_VALUE: 14
PIF_VALUE: 15
PIF_VALUE: 14
PIF_VALUE: 15
PIF_VALUE: 14
PIF_VALUE: 15
PIF_VALUE: 15
PIF_VALUE: 0
PIF_VALUE: 15
PIF_VALUE: 14
PIF_VALUE: 15
PIF_VALUE: 17
PIF_VALUE: 14
PIF_VALUE: 1
PIF_VALUE: 18
PIF_VALUE: 15
PIF_VALUE: 1
PIF_VALUE: 15
PIF_VALUE: 17
PIF_VALUE: 14
PIF_VALUE: 15
PIF_VALUE: 26
PIF_VALUE: 14
PIF_VALUE: 20
PIF_VALUE: 15
PIF_VALUE: 14
PIF_VALUE: 15
PIF_VALUE: 25
PIF_VALUE: 15
PIF_VALUE: 17
PIF_VALUE: 15
PIF_VALUE: 14
PIF_VALUE: 14
PIF_VALUE: 15
PIF_VALUE: 1
PIF_VALUE: 1
PIF_VALUE: 15
PIF_VALUE: 13
PIF_VALUE: 15
PIF_VALUE: 15
PIF_VALUE: 0
PIF_VALUE: 7
PIF_VALUE: 0
PIF_VALUE: 15
PIF_VALUE: 15
PIF_VALUE: 14
PIF_VALUE: 15
PIF_VALUE: 14
PIF_VALUE: 2
PIF_VALUE: 14
PIF_VALUE: 15
PIF_VALUE: 18
PIF_VALUE: 18
PIF_VALUE: 14
PIF_VALUE: 15
PIF_VALUE: 14
PIF_VALUE: 26
PIF_VALUE: 14
PIF_VALUE: 7

## 2018-11-24 ASSESSMENT — PAIN - FUNCTIONAL ASSESSMENT: PAIN_FUNCTIONAL_ASSESSMENT: 0-10

## 2018-11-24 ASSESSMENT — LIFESTYLE VARIABLES: SMOKING_STATUS: 1

## 2018-11-24 NOTE — PROGRESS NOTES
[facility]      Daily Progress Note     Admit Date: 11/22/2018  Bed/Room No.  1017/5280-26  Admitting Physician : Aly Esposito MD  Code Status :Full Code  Hospital Day:  LOS: 2 days   Chief Complaint:     Sore throat   Dysphagia   Voice change     Principal Problem:    Peritonsillar abscess  Active Problems:    Retropharyngeal abscess  Resolved Problems:    * No resolved hospital problems. *    Subjective : Interval History/Significant events :  11/24/18    Patient s/p surgery . In ICU. On propofol . Pain is controlled . Surgery notes reviewed . Patient remains afebrile. Vitals - Stable afebrile  Labs - stable ,     Nursing notes , Consults notes reviewed. Overnight events and updates discussed with Nursing staff . Background History:         Gema King is 66 y.o. female  Who was admitted to the hospital on 11/22/2018 for treatment of Peritonsillar abscess. Patient was transferred from [facility] where she presented with 5 day history of sore throat. Patient started to have sore throat about 5 days ago and was taken to urgent care where she was treated with amoxicillin and sent home. Throat swab was negative for strep. Patient continued to have worsening of symptoms and had difficulty in swallowing for last 3 days. She noticed change in voice and was unable to speak for last 2 days. Patient also had neck pain which was worse on moving neck for last 1 day. Evaluation and [facility] showed peritonsillar abscess which has been extending inferiorly down to the neck. Patient has no past medical history. She has not seen any primary care physician for last 3 years. She does not take any regular medications at home. Patient was started on IV Decadron, IV vancomycin, IV Zosyn. ENT was consulted. And patient has I&D in OR on 11/24/18     PMH:  History reviewed. No pertinent past medical history.    Allergies: No Known Allergies , Kidney function, oxygenation  as indicated . Plan and updates discussed with patient's  and daughter at bed side  ,  answers  explained to satisfaction.    Plan discussed with Staff Master Romero  RN     (Please note that portions of this note were completed with a voice recognition program. Efforts were made to edit the dictations but occasionally words are mis-transcribed.)      Brent Granda MD  11/24/2018

## 2018-11-24 NOTE — PLAN OF CARE
Problem: Nutritional:  Goal: Nutritional status will improve  Nutritional status will improve   Outcome: Ongoing      Problem: Falls - Risk of:  Goal: Will remain free from falls  Will remain free from falls   Outcome: Ongoing  Pt remains free of falls at this time. Bed locked in lowest position, siderails x2, call light in reach. Non-skid footwear applied. Pt ambulates in room with steady gait. Encouraged pt to call for assistance as needed for safety. Falling star posted outside of room. Will continue to monitor.

## 2018-11-24 NOTE — PROGRESS NOTES
Contacted urgently with reported desaturations into the low 80's while asleep and only slightly improved with awakening. Patient describes self as 'improved', with less neck, throat pain, improved range of neck motion, improved swallow function with less pooling of oral secretions. Denies any respiratory issues    CT neck with contrast, 11/23 - retropharyngeal fluid collection, extending from the superior oropharynx to the post-cricoid level. Airway remains patent    Scheduled for I&D retropharyngeal fluid collection/abscess 9:30 this morning    AF, , RR 21, 132/50, 99% face mask    PE: awake, alert, no distress, normal voice, no stridor         less pooling of oral secretions, less frequent self oral suctioning required         improved neck range of motion, and now with only minimal discomfort         neck - no swelling, erythema    Flexible laryngoscopy - markedly improved compare with yesterday's exam, widely patent airway, questionable aspiration of salivary secretions. Mild, pale posterior hypopharyngeal mucosal edema    A/P: Stable/improved            - upper airway is widely patent                      no indication for urgent intervention            - proceed with surgery this am as scheduled            - continue Zosyn per ID, Decadron            - desaturations likely related to sleep state, difficulties handling secretions, COPD history, likely aspiration            - CXR            - highly humidified FM            - to be assessed by Critical Care Medical Service            - discussed with . Sahra Warren. She appeared to understand these findings, plans and considerations. Further, the risks and benefits of surgery were discussed. All questions were answered.  Surgical consent was obtained            - discussed with Nursing            - please call with any questions

## 2018-11-24 NOTE — BRIEF OP NOTE
Brief Postoperative Note  ______________________________________________________________    Patient: Diandra Diallo  YOB: 1960  MRN: [id number]  Date of Procedure: 11/24/2018    Pre-Op Diagnosis: RETROPHARYNGEAL ABSCESS    Post-Op Diagnosis: Same       Procedure(s):  RETROPHARYNGEAL  ABSCESS INCISION AND DRAINAGE, DIRECT LARYNGOSCOPY    Anesthesia: General    Surgeon(s):  Lucyann Scheuermann, MD    Assistant: none    Estimated Blood Loss (mL): minimal    Complications: None    Specimens:   ID Type Source Tests Collected by Time Destination   1 : [id number] Mir Martin MD 11/24/2018 1154      Drains: none    Findings: dictated; gross purulence identified    Lucyann Scheuermann, MD  Date: 11/24/2018  Time: 1:27 PM

## 2018-11-25 LAB
ABSOLUTE EOS #: 0 K/UL (ref 0–0.4)
ABSOLUTE IMMATURE GRANULOCYTE: 0 K/UL (ref 0–0.3)
ABSOLUTE LYMPH #: 0.59 K/UL (ref 1–4.8)
ABSOLUTE MONO #: 0.88 K/UL (ref 0.1–0.8)
ALLEN TEST: POSITIVE
ANION GAP SERPL CALCULATED.3IONS-SCNC: 9 MMOL/L (ref 9–17)
BASOPHILS # BLD: 0 % (ref 0–2)
BASOPHILS ABSOLUTE: 0 K/UL (ref 0–0.2)
BUN BLDV-MCNC: 22 MG/DL (ref 6–20)
BUN/CREAT BLD: ABNORMAL (ref 9–20)
CALCIUM SERPL-MCNC: 8.6 MG/DL (ref 8.6–10.4)
CHLORIDE BLD-SCNC: 111 MMOL/L (ref 98–107)
CO2: 24 MMOL/L (ref 20–31)
CREAT SERPL-MCNC: 0.36 MG/DL (ref 0.5–0.9)
DIFFERENTIAL TYPE: ABNORMAL
EOSINOPHILS RELATIVE PERCENT: 0 % (ref 1–4)
FIO2: 30
GFR AFRICAN AMERICAN: >60 ML/MIN
GFR NON-AFRICAN AMERICAN: >60 ML/MIN
GFR SERPL CREATININE-BSD FRML MDRD: ABNORMAL ML/MIN/{1.73_M2}
GFR SERPL CREATININE-BSD FRML MDRD: ABNORMAL ML/MIN/{1.73_M2}
GLUCOSE BLD-MCNC: 114 MG/DL (ref 65–105)
GLUCOSE BLD-MCNC: 141 MG/DL (ref 65–105)
GLUCOSE BLD-MCNC: 156 MG/DL (ref 70–99)
GLUCOSE BLD-MCNC: 168 MG/DL (ref 65–105)
GLUCOSE BLD-MCNC: 172 MG/DL (ref 65–105)
HCT VFR BLD CALC: 34.5 % (ref 36.3–47.1)
HEMOGLOBIN: 11.2 G/DL (ref 11.9–15.1)
IMMATURE GRANULOCYTES: 0 %
LYMPHOCYTES # BLD: 6 % (ref 24–44)
MCH RBC QN AUTO: 33.6 PG (ref 25.2–33.5)
MCHC RBC AUTO-ENTMCNC: 32.5 G/DL (ref 28.4–34.8)
MCV RBC AUTO: 103.6 FL (ref 82.6–102.9)
MODE: NORMAL
MONOCYTES # BLD: 9 % (ref 1–7)
MORPHOLOGY: ABNORMAL
MRSA, DNA, NASAL: NORMAL
NEGATIVE BASE EXCESS, ART: NORMAL (ref 0–2)
NRBC AUTOMATED: 0 PER 100 WBC
O2 DEVICE/FLOW/%: NORMAL
PATIENT TEMP: NORMAL
PDW BLD-RTO: 11.9 % (ref 11.8–14.4)
PLATELET # BLD: 192 K/UL (ref 138–453)
PLATELET ESTIMATE: ABNORMAL
PMV BLD AUTO: 11.8 FL (ref 8.1–13.5)
POC HCO3: 26.7 MMOL/L (ref 21–28)
POC O2 SATURATION: 96 % (ref 94–98)
POC PCO2 TEMP: NORMAL MM HG
POC PCO2: 44.8 MM HG (ref 35–48)
POC PH TEMP: NORMAL
POC PH: 7.38 (ref 7.35–7.45)
POC PO2 TEMP: NORMAL MM HG
POC PO2: 86.5 MM HG (ref 83–108)
POSITIVE BASE EXCESS, ART: 1 (ref 0–3)
POTASSIUM SERPL-SCNC: 4.2 MMOL/L (ref 3.7–5.3)
RBC # BLD: 3.33 M/UL (ref 3.95–5.11)
RBC # BLD: ABNORMAL 10*6/UL
SAMPLE SITE: NORMAL
SEG NEUTROPHILS: 85 % (ref 36–66)
SEGMENTED NEUTROPHILS ABSOLUTE COUNT: 8.33 K/UL (ref 1.8–7.7)
SODIUM BLD-SCNC: 144 MMOL/L (ref 135–144)
SPECIMEN DESCRIPTION: NORMAL
TCO2 (CALC), ART: 28 MMOL/L (ref 22–29)
WBC # BLD: 9.8 K/UL (ref 3.5–11.3)
WBC # BLD: ABNORMAL 10*3/UL

## 2018-11-25 PROCEDURE — 94770 HC ETCO2 MONITOR DAILY: CPT

## 2018-11-25 PROCEDURE — 6360000002 HC RX W HCPCS: Performed by: INTERNAL MEDICINE

## 2018-11-25 PROCEDURE — 2700000000 HC OXYGEN THERAPY PER DAY

## 2018-11-25 PROCEDURE — S0028 INJECTION, FAMOTIDINE, 20 MG: HCPCS | Performed by: INTERNAL MEDICINE

## 2018-11-25 PROCEDURE — 94762 N-INVAS EAR/PLS OXIMTRY CONT: CPT

## 2018-11-25 PROCEDURE — 6370000000 HC RX 637 (ALT 250 FOR IP): Performed by: INTERNAL MEDICINE

## 2018-11-25 PROCEDURE — 2500000003 HC RX 250 WO HCPCS: Performed by: INTERNAL MEDICINE

## 2018-11-25 PROCEDURE — 80048 BASIC METABOLIC PNL TOTAL CA: CPT

## 2018-11-25 PROCEDURE — 6360000002 HC RX W HCPCS: Performed by: OTOLARYNGOLOGY

## 2018-11-25 PROCEDURE — 85025 COMPLETE CBC W/AUTO DIFF WBC: CPT

## 2018-11-25 PROCEDURE — 2580000003 HC RX 258: Performed by: INTERNAL MEDICINE

## 2018-11-25 PROCEDURE — 99232 SBSQ HOSP IP/OBS MODERATE 35: CPT | Performed by: INTERNAL MEDICINE

## 2018-11-25 PROCEDURE — 36600 WITHDRAWAL OF ARTERIAL BLOOD: CPT

## 2018-11-25 PROCEDURE — 94003 VENT MGMT INPAT SUBQ DAY: CPT

## 2018-11-25 PROCEDURE — 6360000002 HC RX W HCPCS: Performed by: EMERGENCY MEDICINE

## 2018-11-25 PROCEDURE — 2580000003 HC RX 258: Performed by: FAMILY MEDICINE

## 2018-11-25 PROCEDURE — 51798 US URINE CAPACITY MEASURE: CPT

## 2018-11-25 PROCEDURE — 36415 COLL VENOUS BLD VENIPUNCTURE: CPT

## 2018-11-25 PROCEDURE — 6360000002 HC RX W HCPCS: Performed by: FAMILY MEDICINE

## 2018-11-25 PROCEDURE — 99232 SBSQ HOSP IP/OBS MODERATE 35: CPT | Performed by: FAMILY MEDICINE

## 2018-11-25 PROCEDURE — 82803 BLOOD GASES ANY COMBINATION: CPT

## 2018-11-25 PROCEDURE — 82947 ASSAY GLUCOSE BLOOD QUANT: CPT

## 2018-11-25 PROCEDURE — 2000000000 HC ICU R&B

## 2018-11-25 PROCEDURE — 99291 CRITICAL CARE FIRST HOUR: CPT | Performed by: INTERNAL MEDICINE

## 2018-11-25 PROCEDURE — 51701 INSERT BLADDER CATHETER: CPT

## 2018-11-25 RX ORDER — OXYCODONE HCL 5 MG/5 ML
10 SOLUTION, ORAL ORAL EVERY 4 HOURS PRN
Status: DISCONTINUED | OUTPATIENT
Start: 2018-11-25 | End: 2018-11-29

## 2018-11-25 RX ORDER — OXYCODONE HCL 5 MG/5 ML
5 SOLUTION, ORAL ORAL EVERY 4 HOURS PRN
Status: DISCONTINUED | OUTPATIENT
Start: 2018-11-25 | End: 2018-11-29

## 2018-11-25 RX ADMIN — PIPERACILLIN AND TAZOBACTAM 3.38 G: 3; .375 INJECTION, POWDER, LYOPHILIZED, FOR SOLUTION INTRAVENOUS; PARENTERAL at 20:08

## 2018-11-25 RX ADMIN — INSULIN LISPRO 2 UNITS: 100 INJECTION, SOLUTION INTRAVENOUS; SUBCUTANEOUS at 22:10

## 2018-11-25 RX ADMIN — Medication 10 MG: at 22:10

## 2018-11-25 RX ADMIN — INSULIN LISPRO 2 UNITS: 100 INJECTION, SOLUTION INTRAVENOUS; SUBCUTANEOUS at 05:05

## 2018-11-25 RX ADMIN — FAMOTIDINE 20 MG: 10 INJECTION, SOLUTION INTRAVENOUS at 09:50

## 2018-11-25 RX ADMIN — PROPOFOL 10 MCG/KG/MIN: 10 INJECTION, EMULSION INTRAVENOUS at 05:06

## 2018-11-25 RX ADMIN — Medication 10 ML: at 20:08

## 2018-11-25 RX ADMIN — VANCOMYCIN HYDROCHLORIDE 750 MG: 10 INJECTION, POWDER, LYOPHILIZED, FOR SOLUTION INTRAVENOUS at 09:48

## 2018-11-25 RX ADMIN — ENOXAPARIN SODIUM 30 MG: 30 INJECTION SUBCUTANEOUS at 09:50

## 2018-11-25 RX ADMIN — PIPERACILLIN AND TAZOBACTAM 3.38 G: 3; .375 INJECTION, POWDER, LYOPHILIZED, FOR SOLUTION INTRAVENOUS; PARENTERAL at 12:48

## 2018-11-25 RX ADMIN — Medication 10 MG: at 06:09

## 2018-11-25 RX ADMIN — VANCOMYCIN HYDROCHLORIDE 750 MG: 10 INJECTION, POWDER, LYOPHILIZED, FOR SOLUTION INTRAVENOUS at 21:15

## 2018-11-25 RX ADMIN — INSULIN LISPRO 2 UNITS: 100 INJECTION, SOLUTION INTRAVENOUS; SUBCUTANEOUS at 16:43

## 2018-11-25 RX ADMIN — FAMOTIDINE 20 MG: 10 INJECTION, SOLUTION INTRAVENOUS at 20:11

## 2018-11-25 RX ADMIN — CHLORHEXIDINE GLUCONATE 0.12% ORAL RINSE 15 ML: 1.2 LIQUID ORAL at 09:50

## 2018-11-25 RX ADMIN — Medication 10 MG: at 12:53

## 2018-11-25 RX ADMIN — PIPERACILLIN AND TAZOBACTAM 3.38 G: 3; .375 INJECTION, POWDER, LYOPHILIZED, FOR SOLUTION INTRAVENOUS; PARENTERAL at 03:10

## 2018-11-25 ASSESSMENT — PAIN SCALES - GENERAL
PAINLEVEL_OUTOF10: 0
PAINLEVEL_OUTOF10: 0

## 2018-11-25 ASSESSMENT — PULMONARY FUNCTION TESTS
PIF_VALUE: 13
PIF_VALUE: 17

## 2018-11-25 NOTE — PROGRESS NOTES
49 14 100 % - -   11/24/18 1800 (!) 112/33 - - 51 14 100 % - -   11/24/18 1745 (!) 105/34 - - 52 14 - - -   11/24/18 1733 (!) 110/36 - - 54 14 - - -   11/24/18 1732 (!) 110/36 - - 54 14 - - -   11/24/18 1700 (!) 107/33 - - 55 14 100 % - -   11/24/18 1620 - - - 61 14 100 % - -   11/24/18 1600 (!) 132/49 96.4 °F (35.8 °C) Axillary 79 16 100 % - -   11/24/18 1500 (!) 131/45 - - 73 16 100 % - -   11/24/18 1400 (!) 130/49 97.5 °F (36.4 °C) Oral 79 15 100 % 5' 5\" (1.651 m) 105 lb 9.6 oz (47.9 kg)   11/24/18 0945 (!) 119/46 97 °F (36.1 °C) Temporal 78 20 93 % - -   11/24/18 0745 (!) 136/48 98 °F (36.7 °C) Axillary 85 20 94 % - -     Intake / output   11/24 0701 - 11/25 0700  In: 2185 [I.V.:2185]  Out: 335 [Urine:325]  Physical Exam:  Physical Exam   Constitutional: Vital signs are normal. She appears well-developed and well-nourished. No distress. She is intubated. HENT:   Head: Normocephalic and atraumatic. Mouth/Throat: No oropharyngeal exudate. Eyes: Pupils are equal, round, and reactive to light. Conjunctivae are normal. No scleral icterus. Neck: No JVD present. Muscular tenderness present. Edema present. No thyromegaly present. Cardiovascular: Normal rate, regular rhythm, normal heart sounds and intact distal pulses. Exam reveals no gallop and no friction rub. No murmur heard. Pulmonary/Chest: Effort normal and breath sounds normal. She is intubated. No respiratory distress. She has no wheezes. She has no rales. Abdominal: Soft. Bowel sounds are normal. She exhibits no distension and no mass. There is no tenderness. There is no rebound and no guarding. Lymphadenopathy:        Head (right side): Submandibular adenopathy present. She has no cervical adenopathy. Neurological: She is alert. She displays normal reflexes. No cranial nerve deficit. She exhibits normal muscle tone. Communicating by written language . Skin: No rash noted. She is not diaphoretic. No erythema.    Nursing note and vitals reviewed. Lower Extremities : No ankle Edema , No calf Tenderness     Laboratory findings:    Recent Labs      11/23/18   0556  11/24/18   1616  11/25/18   0437   WBC  15.7*  13.0*  9.8   HGB  12.7  12.2  11.2*   HCT  38.5  37.4  34.5*   PLT  202  217  192   INR  0.9   --    --      Recent Labs      11/23/18   0556  11/24/18   1536  11/25/18   0437   NA  140  141  144   K  3.7  4.4  4.2   CL  104  109*  111*   CO2  22  20  24   GLUCOSE  170*  153*  156*   BUN  12  22*  22*   CREATININE  0.32*  0.39*  0.36*   MG  2.2   --    --    CALCIUM  9.1  9.0  8.6   CAION  1.19   --    --    PHOS  2.7   --    --      Recent Labs      11/23/18   0556   PROT  6.5   LABALBU  3.1*   LABA1C  4.9   AST  13   ALT  13   ALKPHOS  59   BILITOT  0.83          No results found for: Germán Mattes, RBCUA, BLOODU, BACTERIA, NITRU, WBCUA, LEUKOCYTESUR    Imaging/Diagonstics:     CT neck 11/22/18   Very  extensive very thick prevertebral heparin abscess. Warm most extent of prevertebral material at the mid thyroid level or so. Though more fluid is present in inferior to the edges sent to the carotid arteries and lower thyroid gland anteriorly. Surgical consultation as indicated. Exact percent drop aforementioned processes unclear. Could be from right tonsillar area. Moderate compromise of hypopharyngeal airway.     CT soft tissue neck 11/23/18   Impression:     Interval increase in size of retropharyngeal abscess, now seen extending  caudally behind the esophagus and superiorly into the right-sided  oropharyngeal wall.        Cultures - retropharyngeal abscess 11/23/18   FEW NEUTROPHILS     Direct Exam  (Abnormal) 11/24/2018  1:45 PM [address] NEGATIVE RODS     Direct Exam  (Abnormal) 11/24/2018  1:45 PM [address] [place] [address] RODS     Direct Exam  (Abnormal) 11/24/2018  1:45 PM [address] COCCI IN CLUSTERS           Clinical Course :

## 2018-11-25 NOTE — OP NOTE
[address] [facility]                                OPERATIVE REPORT    PATIENT NAME: Serjio Xei                 :        1960  MED REC NO:   [id number]                             ROOM:       0101  ACCOUNT NO:   [de-identified]                           ADMIT DATE: 2018  PROVIDER:     Leopoldo Darner    DATE OF PROCEDURE:  2018    PREOPERATIVE DIAGNOSES:  Retropharyngeal abscess, dysphagia, throat  pain, hypopharyngeal obstruction. POSTOPERATIVE DIAGNOSES:  Retropharyngeal abscess, dysphagia, throat  pain, hypopharyngeal obstruction. PROCEDURES:  1. Incision and drainage of retropharyngeal abscess. 2.  Direct laryngoscopy. SURGEON:  Solitario Mendoza. Johanna Onofre MD     INDICATION:  This is a 62-year-old who was admitted 2 days prior on  transfer with a suspected peritonsillar abscess. Outside CT neck with  contrast suggested extensive thick prevertebral abscess with the lower  most extent at the level of the thyroid. The patient was initiated on  Zosyn and vancomycin. Decadron was begun as well. Initial bedside exam  revealed a 62-year-old, in no acute distress. Her voice is strong and  intact and grossly unremarkable. She was unable to tolerate her own  secretions. Flexible laryngoscopy performed at the bedside revealed  submucosal fullness about the right nasopharynx and oropharynx. Laryngeal exam was unremarkable. The posterior hypopharynx displayed  pale mucosal edema. There was significant pooling of secretions. Repeat CT neck with contrast was performed yesterday evening. This  revealed a diffuse bilateral, left worse than right, retropharyngeal  fluid collection extending from the superior oropharynx/nasopharynx to  the postcricoid region. Repeat flexible laryngoscopy this morning  showed significant improvement. The airway remained widely patent.    There was submucosal fullness about the right oropharynx with  generalized mild pale edema of the hypopharynx. The patient was  tolerating her secretions much more effectively with improved cough, described left neck and throat pain along with improved range of neck  motion. Family does admit to an extended history of neck disease  including chronic neck pain, longstanding mild trismus, and generalized  poor range of neck motion. She now presents for surgical procedure. FINDINGS:  1. There is no appreciable tonsil tissue. Soft palate and uvula  displayed mild edema only. The nasopharynx was grossly unremarkable. There was mild, right worse than left, mucosal edema only. Oropharyngeal exam revealed a 2 x 2  cm circumferential area of mucosal swelling posterior and inferior to  the tonsillar fossa. Gross purulence was expressed lateral to this  area. 2.  Two additional incisions were made about the left mid hypopharynx,  at the level of the epiglottic tip and the epiglottic base. No  additional purulence was expressed from these area. TECHNIQUE:  The patient was brought to the operating room and placed in  the supine position. Inhalation anesthesia was induced via oral  endotracheal tube. Intubation was performed on second attempt. Initial attempt was made with a standard laryngoscope. Intubation was  performed utilizing the GlideScope. There was noted moderate trismus  with a generous-sized tongue. General anesthesia was induced. Head of  bed rotated and shoulder roll positioned. The patient was draped in  standard fashion. Estuardo mouth gag was placed into suspension. Suspension was limited by noted trismus. Oral  exam revealed findings as above. An incision was made overllying the oropharyngeal area of swelling. Gross purulence  was expressed. Aerobic and anaerobic cultures were obtained. Significant green purulence was then milked from the incision, both  inferiorly and superiorly.   Curved suction was passed GERALDO    D: 11/24/2018 13:56:15       T: 11/25/2018 1:51:15     [email or web address]  Job#: [id number]     Doc#: [id number]    CC:

## 2018-11-25 NOTE — FLOWSHEET NOTE
Dr Yandy Wasserman called the floor to answer secured message.   Writer received order over telephone with read back to have a Speech eval scheduled before starting PO intake, and to allow activity as, up to commode/ toilet, up to chair

## 2018-11-25 NOTE — PROGRESS NOTES
Allergies:   Patient has no known allergies. Review of Systems:     Review of Systems   Constitutional: Negative. HENT:        Difficulty swallowing still   Eyes: Negative. Respiratory: Negative. Cardiovascular: Negative. Gastrointestinal: Negative. Endocrine: Negative. Genitourinary: Negative. Musculoskeletal: Negative. Skin: Negative. Allergic/Immunologic: Negative. Neurological: Negative. Hematological: Negative. Psychiatric/Behavioral: Negative. Physical Examination :     Patient Vitals for the past 8 hrs:   BP Temp Temp src Pulse Resp SpO2   11/25/18 1400 (!) 121/29 - - 52 20 96 %   11/25/18 1300 (!) 136/50 - - 63 18 96 %   11/25/18 1200 (!) 133/37 98.1 °F (36.7 °C) Axillary 67 17 94 %   11/25/18 1100 (!) 115/39 - - 55 14 95 %   11/25/18 1000 (!) 124/38 - - 53 17 98 %   11/25/18 0900 (!) 131/36 - - 58 12 100 %       Physical Exam   Constitutional: She is oriented to person, place, and time. She appears well-developed and well-nourished. HENT:   Head: Normocephalic and atraumatic. Eyes: Conjunctivae are normal. No scleral icterus. Neck: Neck supple. No tracheal deviation present. Cardiovascular: Normal heart sounds. No murmur heard. Pulmonary/Chest: Breath sounds normal. No respiratory distress. She has no wheezes. Abdominal: She exhibits no distension. There is no tenderness. Genitourinary: No vaginal discharge found. Genitourinary Comments: Urine clear   Musculoskeletal: She exhibits no edema or deformity. Neurological: She is alert and oriented to person, place, and time. Skin: Skin is warm and dry. Psychiatric: She has a normal mood and affect.  Her behavior is normal.         Medical Decision Making:   I have independently reviewed/ordered the following labs:    CBC with Differential:   Recent Labs      11/24/18   1616  11/25/18   0437   WBC  13.0*  9.8   HGB  12.2  11.2*   HCT  37.4  34.5*   PLT  217  192   LYMPHOPCT  6*  6*

## 2018-11-26 ENCOUNTER — APPOINTMENT (OUTPATIENT)
Dept: GENERAL RADIOLOGY | Age: 58
DRG: 133 | End: 2018-11-26
Attending: FAMILY MEDICINE
Payer: COMMERCIAL

## 2018-11-26 PROBLEM — R13.14 PHARYNGOESOPHAGEAL DYSPHAGIA: Status: ACTIVE | Noted: 2018-11-26

## 2018-11-26 LAB
ABSOLUTE EOS #: <0.03 K/UL (ref 0–0.44)
ABSOLUTE IMMATURE GRANULOCYTE: 0.13 K/UL (ref 0–0.3)
ABSOLUTE LYMPH #: 0.94 K/UL (ref 1.1–3.7)
ABSOLUTE MONO #: 0.5 K/UL (ref 0.1–1.2)
ANION GAP SERPL CALCULATED.3IONS-SCNC: 8 MMOL/L (ref 9–17)
BASOPHILS # BLD: 0 % (ref 0–2)
BASOPHILS ABSOLUTE: <0.03 K/UL (ref 0–0.2)
BUN BLDV-MCNC: 24 MG/DL (ref 6–20)
BUN/CREAT BLD: ABNORMAL (ref 9–20)
CALCIUM SERPL-MCNC: 8.3 MG/DL (ref 8.6–10.4)
CHLORIDE BLD-SCNC: 114 MMOL/L (ref 98–107)
CO2: 22 MMOL/L (ref 20–31)
CREAT SERPL-MCNC: 0.44 MG/DL (ref 0.5–0.9)
DIFFERENTIAL TYPE: ABNORMAL
EOSINOPHILS RELATIVE PERCENT: 0 % (ref 1–4)
FOLATE: 17.1 NG/ML
GFR AFRICAN AMERICAN: >60 ML/MIN
GFR NON-AFRICAN AMERICAN: >60 ML/MIN
GFR SERPL CREATININE-BSD FRML MDRD: ABNORMAL ML/MIN/{1.73_M2}
GFR SERPL CREATININE-BSD FRML MDRD: ABNORMAL ML/MIN/{1.73_M2}
GLUCOSE BLD-MCNC: 133 MG/DL (ref 70–99)
GLUCOSE BLD-MCNC: 134 MG/DL (ref 65–105)
GLUCOSE BLD-MCNC: 141 MG/DL (ref 65–105)
GLUCOSE BLD-MCNC: 161 MG/DL (ref 65–105)
GLUCOSE BLD-MCNC: 259 MG/DL (ref 65–105)
HCT VFR BLD CALC: 34.6 % (ref 36.3–47.1)
HEMOGLOBIN: 11.4 G/DL (ref 11.9–15.1)
IMMATURE GRANULOCYTES: 2 %
LYMPHOCYTES # BLD: 11 % (ref 24–43)
MCH RBC QN AUTO: 34.2 PG (ref 25.2–33.5)
MCHC RBC AUTO-ENTMCNC: 32.9 G/DL (ref 28.4–34.8)
MCV RBC AUTO: 103.9 FL (ref 82.6–102.9)
MONOCYTES # BLD: 6 % (ref 3–12)
NRBC AUTOMATED: 0 PER 100 WBC
PDW BLD-RTO: 11.9 % (ref 11.8–14.4)
PLATELET # BLD: 232 K/UL (ref 138–453)
PLATELET ESTIMATE: ABNORMAL
PMV BLD AUTO: 11.8 FL (ref 8.1–13.5)
POTASSIUM SERPL-SCNC: 3.7 MMOL/L (ref 3.7–5.3)
RBC # BLD: 3.33 M/UL (ref 3.95–5.11)
RBC # BLD: ABNORMAL 10*6/UL
SEG NEUTROPHILS: 81 % (ref 36–65)
SEGMENTED NEUTROPHILS ABSOLUTE COUNT: 7.23 K/UL (ref 1.5–8.1)
SODIUM BLD-SCNC: 144 MMOL/L (ref 135–144)
VANCOMYCIN TROUGH DATE LAST DOSE: NORMAL
VANCOMYCIN TROUGH DOSE AMOUNT: NORMAL
VANCOMYCIN TROUGH TIME LAST DOSE: NORMAL
VANCOMYCIN TROUGH: 10.6 UG/ML (ref 10–20)
VITAMIN B-12: 1161 PG/ML (ref 232–1245)
WBC # BLD: 8.8 K/UL (ref 3.5–11.3)
WBC # BLD: ABNORMAL 10*3/UL

## 2018-11-26 PROCEDURE — 36415 COLL VENOUS BLD VENIPUNCTURE: CPT

## 2018-11-26 PROCEDURE — 6370000000 HC RX 637 (ALT 250 FOR IP): Performed by: INTERNAL MEDICINE

## 2018-11-26 PROCEDURE — 6360000002 HC RX W HCPCS: Performed by: INTERNAL MEDICINE

## 2018-11-26 PROCEDURE — 6360000002 HC RX W HCPCS: Performed by: FAMILY MEDICINE

## 2018-11-26 PROCEDURE — 92611 MOTION FLUOROSCOPY/SWALLOW: CPT

## 2018-11-26 PROCEDURE — 1200000000 HC SEMI PRIVATE

## 2018-11-26 PROCEDURE — 99233 SBSQ HOSP IP/OBS HIGH 50: CPT | Performed by: INTERNAL MEDICINE

## 2018-11-26 PROCEDURE — 2500000003 HC RX 250 WO HCPCS: Performed by: INTERNAL MEDICINE

## 2018-11-26 PROCEDURE — 76937 US GUIDE VASCULAR ACCESS: CPT

## 2018-11-26 PROCEDURE — 74230 X-RAY XM SWLNG FUNCJ C+: CPT

## 2018-11-26 PROCEDURE — 6360000002 HC RX W HCPCS: Performed by: EMERGENCY MEDICINE

## 2018-11-26 PROCEDURE — 2500000003 HC RX 250 WO HCPCS: Performed by: FAMILY MEDICINE

## 2018-11-26 PROCEDURE — 80202 ASSAY OF VANCOMYCIN: CPT

## 2018-11-26 PROCEDURE — 2580000003 HC RX 258: Performed by: FAMILY MEDICINE

## 2018-11-26 PROCEDURE — G8996 SWALLOW CURRENT STATUS: HCPCS

## 2018-11-26 PROCEDURE — G8979 MOBILITY GOAL STATUS: HCPCS

## 2018-11-26 PROCEDURE — 2580000003 HC RX 258: Performed by: EMERGENCY MEDICINE

## 2018-11-26 PROCEDURE — 82746 ASSAY OF FOLIC ACID SERUM: CPT

## 2018-11-26 PROCEDURE — 97530 THERAPEUTIC ACTIVITIES: CPT

## 2018-11-26 PROCEDURE — 6360000002 HC RX W HCPCS: Performed by: OTOLARYNGOLOGY

## 2018-11-26 PROCEDURE — G8980 MOBILITY D/C STATUS: HCPCS

## 2018-11-26 PROCEDURE — 99232 SBSQ HOSP IP/OBS MODERATE 35: CPT | Performed by: INTERNAL MEDICINE

## 2018-11-26 PROCEDURE — 0CJS8ZZ INSPECTION OF LARYNX, VIA NATURAL OR ARTIFICIAL OPENING ENDOSCOPIC: ICD-10-PCS | Performed by: OTOLARYNGOLOGY

## 2018-11-26 PROCEDURE — 97161 PT EVAL LOW COMPLEX 20 MIN: CPT

## 2018-11-26 PROCEDURE — 36569 INSJ PICC 5 YR+ W/O IMAGING: CPT

## 2018-11-26 PROCEDURE — 80048 BASIC METABOLIC PNL TOTAL CA: CPT

## 2018-11-26 PROCEDURE — 82607 VITAMIN B-12: CPT

## 2018-11-26 PROCEDURE — S0028 INJECTION, FAMOTIDINE, 20 MG: HCPCS | Performed by: INTERNAL MEDICINE

## 2018-11-26 PROCEDURE — 99232 SBSQ HOSP IP/OBS MODERATE 35: CPT | Performed by: FAMILY MEDICINE

## 2018-11-26 PROCEDURE — G8978 MOBILITY CURRENT STATUS: HCPCS

## 2018-11-26 PROCEDURE — 85025 COMPLETE CBC W/AUTO DIFF WBC: CPT

## 2018-11-26 PROCEDURE — C1751 CATH, INF, PER/CENT/MIDLINE: HCPCS

## 2018-11-26 PROCEDURE — 02HV33Z INSERTION OF INFUSION DEVICE INTO SUPERIOR VENA CAVA, PERCUTANEOUS APPROACH: ICD-10-PCS | Performed by: FAMILY MEDICINE

## 2018-11-26 PROCEDURE — 94762 N-INVAS EAR/PLS OXIMTRY CONT: CPT

## 2018-11-26 PROCEDURE — G8997 SWALLOW GOAL STATUS: HCPCS

## 2018-11-26 PROCEDURE — 2580000003 HC RX 258: Performed by: INTERNAL MEDICINE

## 2018-11-26 PROCEDURE — 82947 ASSAY GLUCOSE BLOOD QUANT: CPT

## 2018-11-26 PROCEDURE — B548ZZA ULTRASONOGRAPHY OF SUPERIOR VENA CAVA, GUIDANCE: ICD-10-PCS | Performed by: FAMILY MEDICINE

## 2018-11-26 RX ORDER — LIDOCAINE HYDROCHLORIDE 10 MG/ML
5 INJECTION, SOLUTION INFILTRATION; PERINEURAL ONCE
Status: DISCONTINUED | OUTPATIENT
Start: 2018-11-26 | End: 2018-11-29

## 2018-11-26 RX ORDER — SODIUM CHLORIDE 9 MG/ML
INJECTION, SOLUTION INTRAVENOUS CONTINUOUS
Status: DISCONTINUED | OUTPATIENT
Start: 2018-11-26 | End: 2018-11-30 | Stop reason: HOSPADM

## 2018-11-26 RX ORDER — DEXTROSE AND SODIUM CHLORIDE 5; .45 G/100ML; G/100ML
INJECTION, SOLUTION INTRAVENOUS CONTINUOUS
Status: DISCONTINUED | OUTPATIENT
Start: 2018-11-26 | End: 2018-11-26

## 2018-11-26 RX ORDER — DEXAMETHASONE SODIUM PHOSPHATE 4 MG/ML
4 INJECTION, SOLUTION INTRA-ARTICULAR; INTRALESIONAL; INTRAMUSCULAR; INTRAVENOUS; SOFT TISSUE EVERY 8 HOURS
Status: DISCONTINUED | OUTPATIENT
Start: 2018-11-26 | End: 2018-11-28

## 2018-11-26 RX ORDER — SODIUM CHLORIDE 0.9 % (FLUSH) 0.9 %
10 SYRINGE (ML) INJECTION EVERY 12 HOURS SCHEDULED
Status: DISCONTINUED | OUTPATIENT
Start: 2018-11-26 | End: 2018-11-30 | Stop reason: HOSPADM

## 2018-11-26 RX ORDER — SODIUM CHLORIDE 0.9 % (FLUSH) 0.9 %
10 SYRINGE (ML) INJECTION PRN
Status: DISCONTINUED | OUTPATIENT
Start: 2018-11-26 | End: 2018-11-30 | Stop reason: HOSPADM

## 2018-11-26 RX ADMIN — MAGNESIUM SULFATE HEPTAHYDRATE: 500 INJECTION, SOLUTION INTRAMUSCULAR; INTRAVENOUS at 17:44

## 2018-11-26 RX ADMIN — INSULIN LISPRO 2 UNITS: 100 INJECTION, SOLUTION INTRAVENOUS; SUBCUTANEOUS at 11:03

## 2018-11-26 RX ADMIN — INSULIN LISPRO 6 UNITS: 100 INJECTION, SOLUTION INTRAVENOUS; SUBCUTANEOUS at 22:13

## 2018-11-26 RX ADMIN — Medication 10 ML: at 19:51

## 2018-11-26 RX ADMIN — VANCOMYCIN HYDROCHLORIDE 750 MG: 10 INJECTION, POWDER, LYOPHILIZED, FOR SOLUTION INTRAVENOUS at 08:34

## 2018-11-26 RX ADMIN — INSULIN LISPRO 2 UNITS: 100 INJECTION, SOLUTION INTRAVENOUS; SUBCUTANEOUS at 15:44

## 2018-11-26 RX ADMIN — ENOXAPARIN SODIUM 30 MG: 30 INJECTION SUBCUTANEOUS at 08:34

## 2018-11-26 RX ADMIN — PIPERACILLIN AND TAZOBACTAM 3.38 G: 3; .375 INJECTION, POWDER, LYOPHILIZED, FOR SOLUTION INTRAVENOUS; PARENTERAL at 19:51

## 2018-11-26 RX ADMIN — PIPERACILLIN AND TAZOBACTAM 3.38 G: 3; .375 INJECTION, POWDER, LYOPHILIZED, FOR SOLUTION INTRAVENOUS; PARENTERAL at 11:04

## 2018-11-26 RX ADMIN — SODIUM CHLORIDE: 9 INJECTION, SOLUTION INTRAVENOUS at 19:29

## 2018-11-26 RX ADMIN — DEXAMETHASONE SODIUM PHOSPHATE 4 MG: 4 INJECTION, SOLUTION INTRAMUSCULAR; INTRAVENOUS at 22:13

## 2018-11-26 RX ADMIN — FAMOTIDINE 20 MG: 10 INJECTION, SOLUTION INTRAVENOUS at 19:51

## 2018-11-26 RX ADMIN — Medication 10 MG: at 05:46

## 2018-11-26 RX ADMIN — I.V. FAT EMULSION 250 ML: 20 EMULSION INTRAVENOUS at 17:49

## 2018-11-26 RX ADMIN — DEXTROSE AND SODIUM CHLORIDE: 5; 450 INJECTION, SOLUTION INTRAVENOUS at 13:29

## 2018-11-26 RX ADMIN — PIPERACILLIN AND TAZOBACTAM 3.38 G: 3; .375 INJECTION, POWDER, LYOPHILIZED, FOR SOLUTION INTRAVENOUS; PARENTERAL at 03:50

## 2018-11-26 RX ADMIN — FAMOTIDINE 20 MG: 10 INJECTION, SOLUTION INTRAVENOUS at 08:34

## 2018-11-26 RX ADMIN — DEXAMETHASONE SODIUM PHOSPHATE 4 MG: 4 INJECTION, SOLUTION INTRAMUSCULAR; INTRAVENOUS at 15:44

## 2018-11-26 RX ADMIN — SODIUM CHLORIDE: 9 INJECTION, SOLUTION INTRAVENOUS at 06:43

## 2018-11-26 ASSESSMENT — PAIN SCALES - GENERAL
PAINLEVEL_OUTOF10: 0

## 2018-11-26 ASSESSMENT — ENCOUNTER SYMPTOMS
SINUS PRESSURE: 0
EYES NEGATIVE: 1
CONSTIPATION: 0
SHORTNESS OF BREATH: 0
ABDOMINAL PAIN: 0
WHEEZING: 0
NAUSEA: 0
GASTROINTESTINAL NEGATIVE: 1
BLOOD IN STOOL: 0
TROUBLE SWALLOWING: 1
DIARRHEA: 0
RESPIRATORY NEGATIVE: 1
CHEST TIGHTNESS: 0
COUGH: 0
VOICE CHANGE: 0
VOMITING: 0
ALLERGIC/IMMUNOLOGIC NEGATIVE: 1
SORE THROAT: 0

## 2018-11-26 NOTE — PROCEDURES
concerns    Impressions:  Pt with +deep penetration and eventual aspiration of puree and nectar thick by tsp, no immediate cough, +latent cough. Mod-max residual in vallec, pyriform, laryngeal vestibule, minimally decreased residual with 3-4 subsequent swallows but unable to clear. Pt reports no history of dysphagia prior to retropharyngeal abscess (incision and drainage of abscess 11/24/18). Recommend pt remain NPO with alternate means of nutrition. Treatment Dx and ICD 10: dysphagia   Patient Position: Lateral and Patient Degrees: 80      Consistencies Administered: Puree;Nectar  teaspoon          Dysphagia Outcome Severity Scale: Level 1: Severe dysphagia- NPO. Unable to tolerate any PO safely  Penetration-Aspiration Scale (PAS): 5 - Material enters the airway, contacts the vocal folds, and is not ejected into the airway    Recommended Diet:  Solid consistency: NPO  Liquid consistency: NPO       Medication administration: Via NG/PEG      Recommendations/Treatment  Requires SLP Intervention: Yes        D/C Recommendations: To be determined         Recommended Exercises:    Therapeutic Interventions: Oral care;Oral motor exercises; Patient/Family education;Pharyngeal exercises; Laryngeal exercises; Demetrice; Tongue base strengthening         Education: Images and recommendations were reviewed with pt, RN following this exam.   Patient Education: yes  Patient Education Response: Verbalizes understanding    Prognosis  Prognosis for safe diet advancement: fair  Duration/Frequency of Treatment  Duration/Frequency of Treatment: 3-5x/week      Goals:    Long Term: Pt will safely tolerate PO diet. Short Term:     Goal 1: Pt will complete oral motor exercises for dysphagia x10 x2 sets. Oral Preparation / Oral Phase  Oral Phase: WFL        Pharyngeal Phase  Pharyngeal Phase: Impaired   Puree: deep penetration during swallow, unable to clear, eventual aspiration. +latent cough.  Mod-max residual in vallec,

## 2018-11-26 NOTE — PROGRESS NOTES
BUSTILLO'S CATHETER:   [x] No   [] Yes  (Date of Insertion:   )     URINE OUTPUT:            [x] Good   [] Low              [] Anuric      OBJECTIVE:     VITAL SIGNS:  BP (!) 142/29   Pulse 52   Temp 98.5 °F (36.9 °C) (Oral)   Resp 18   Ht 5' 5\" (1.651 m)   Wt 105 lb 9.6 oz (47.9 kg)   LMP 2013 (Within Months)   SpO2 95%   BMI 17.57 kg/m²   Tmax over 24 hours:  Temp (24hrs), Av.4 °F (36.9 °C), Min:98.1 °F (36.7 °C), Max:98.6 °F (37 °C)      Patient Vitals for the past 6 hrs:   BP Temp Temp src Pulse Resp SpO2   18 0800 (!) 142/29 98.5 °F (36.9 °C) Oral 52 18 95 %   18 0700 - - - 73 25 -   18 0600 (!) 146/35 - - (!) 49 15 94 %   18 0500 (!) 141/40 - - (!) 45 16 93 %   18 0400 (!) 131/33 98.4 °F (36.9 °C) Oral (!) 46 18 95 %   18 0300 (!) 139/46 - - (!) 45 18 96 %         Intake/Output Summary (Last 24 hours) at 18 0842  Last data filed at 18 0800   Gross per 24 hour   Intake             2841 ml   Output             1000 ml   Net             1841 ml     Wt Readings from Last 2 Encounters:   18 105 lb 9.6 oz (47.9 kg)     Body mass index is 17.57 kg/m². PHYSICAL EXAMINATION:  Constitutional: Appears well, no distress  EENT: PERRLA, EOMI, sclera clear, no lesions, neck supple with midline trachea. Neck: Supple, symmetrical, trachea midline, no adenopathy,  no jvd, skin normal  Respiratory: clear to auscultation, no wheezes or rales and unlabored breathing.   Cardiovascular: regular rate and rhythm, normal S1, S2, no murmur noted   Abdomen: soft, nontender, nondistended, no masses or organomegaly  Extremities:  peripheral pulses normal, no pedal edema, no clubbing or cyanosis  Neuro: moves all 4 extremities     Any additional physical findings:    MEDICATIONS:    Scheduled Meds:   insulin lispro  0-12 Units Subcutaneous Q6H    famotidine (PEPCID) injection  20 mg Intravenous BID    vancomycin (VANCOCIN) intermittent dosing (placeholder) Other RX Placeholder    vancomycin  750 mg Intravenous Q12H    enoxaparin  30 mg Subcutaneous Daily    dexamethasone  10 mg Intravenous Q8H    sodium chloride flush  10 mL Intravenous 2 times per day    piperacillin-tazobactam  3.375 g Intravenous Q8H     Continuous Infusions:   dextrose      sodium chloride 100 mL/hr at 11/26/18 0643     PRN Meds:     oxyCODONE 5 mg Q4H PRN   Or     oxyCODONE 10 mg Q4H PRN   glucose 15 g PRN   dextrose 12.5 g PRN   glucagon (rDNA) 1 mg PRN   dextrose 100 mL/hr PRN   sodium chloride flush 10 mL PRN   morphine 2 mg Q4H PRN         VENT SETTINGS (Comprehensive) (if applicable):  Vent Information  $Ventilation: $Subsequent Day  Ventilator Started: Yes  Ventilator Stopped: Yes  Ventilation Day(s): 1  Vent Type: Servo i  Vent Mode: CPAP  Vt Ordered: 450 mL  Rate Set: 14 bmp  Pressure Support: 8 cmH20  FiO2 : 30 %  Sensitivity: 5  PEEP/CPAP: 5  I Time/ I Time %: 0.9 s  Humidification Source: HME  Additional Respiratory  Assessments  Pulse: 52  Resp: 18  SpO2: 95 %  End Tidal CO2: 36 (%)  Position: Semi-Contreras's  Humidification Source: HME  Oral Care Completed?: Yes  Oral Care: Mouthwash, Mouth swabbed, Mouth moisturizer  Subglottic Suction Done?: Yes      Laboratory findings:    Complete Blood Count: Recent Labs      11/24/18   1616  11/25/18   0437  11/26/18   0746   WBC  13.0*  9.8  8.8   HGB  12.2  11.2*  11.4*   HCT  37.4  34.5*  34.6*   PLT  217  192  232        Last 3 Blood Glucose:   Recent Labs      11/24/18   1536  11/25/18   0437   GLUCOSE  153*  156*        PT/INR:    Lab Results   Component Value Date    PROTIME 9.4 11/23/2018    INR 0.9 11/23/2018     PTT:  No results found for: APTT, PTT    Comprehensive Metabolic Profile:   Recent Labs      11/24/18   1536  11/25/18   0437   NA  141  144   K  4.4  4.2   CL  109*  111*   CO2  20  24   BUN  22*  22*   CREATININE  0.39*  0.36*   GLUCOSE  153*  156*   CALCIUM  9.0  8.6      Magnesium:   Lab Results   Component Value Date

## 2018-11-26 NOTE — PROGRESS NOTES
[facility]  Occupational Therapy Not Seen Note    DATE: 2018  Name: Steen Goodell  : 1960  MRN: [id number]    Patient not available for Occupational Therapy due to:    [] Testing:    [] Hemodialysis    [] Blood Transfusion in Progress    []Refusal by Patient:    [] Surgery/Procedure:    [] Strict Bedrest    [] Sedation    [] Spine Precautions     [] Pt being transferred to palliative care at this time. Spoke with pt/family and OT services to be defered. [x] Per pt, pt independent with functional mobility and functional tasks. Pt reports no concerns with being able to complete daily tasks.  Pt with no OT acute care needs at this time, will defer OT eval.    Signature: Carlota Dumont OTR/EZEQUIEL

## 2018-11-26 NOTE — PLAN OF CARE
Problem: Nutritional:  Goal: Nutritional status will improve  Nutritional status will improve   Outcome: Ongoing      Problem: Physical Regulation:  Goal: Diagnostic test results will improve  Diagnostic test results will improve   Outcome: Ongoing    Goal: Will remain free from infection  Will remain free from infection   Outcome: Ongoing    Goal: Ability to maintain vital signs within normal range will improve  Ability to maintain vital signs within normal range will improve   Outcome: Ongoing      Problem: Respiratory:  Goal: Ability to maintain normal respiratory secretions will improve  Ability to maintain normal respiratory secretions will improve   Outcome: Ongoing      Problem: Skin Integrity:  Goal: Demonstration of wound healing without infection will improve  Demonstration of wound healing without infection will improve   Outcome: Ongoing    Goal: Complications related to intravenous access or infusion will be avoided or minimized  Complications related to intravenous access or infusion will be avoided or minimized   Outcome: Ongoing      Problem: Falls - Risk of:  Goal: Absence of physical injury  Absence of physical injury   Outcome: Ongoing      Problem: Nutrition  Goal: Optimal nutrition therapy  Outcome: Ongoing      Problem: Risk for Impaired Skin Integrity  Goal: Tissue integrity - skin and mucous membranes  Structural intactness and normal physiological function of skin and  mucous membranes.    Outcome: Ongoing

## 2018-11-26 NOTE — PROGRESS NOTES
Nutrition Assessment (Parenteral Nutrition)    Type and Reason for Visit: Reassess    Nutrition Recommendations: Continue NPO. Parenteral Nutrition ordered to start tonight by MD. Monitor TPN adequacy/labs. Nutrition Assessment: Pt nutritionally compromised as evidenced by swallowing difficulty/NPO status and need for nutrition support. Noted TPN ordered to start tonight. Malnutrition Assessment:  · Malnutrition Status: Insufficient data    Nutrition Risk Level: High    Nutrient Needs:  · Estimated Daily Total Kcal: 3504-0464 kcal/day  · Estimated Daily Protein (g): 70-85 g/day     Nutrition Diagnosis:   · Problem: Inadequate oral intake  · Etiology: related to swallowing difficulty, failed video swallow study     Signs and symptoms:  as evidenced by NPO status,need for nutrition support-PN    Objective Information:  · Current Nutrition Therapies:  · Oral Diet Orders: NPO   · Parenteral Nutrition Orders:  · Type and Formula: 2-in-1 Custom (200 g Dex, 50 g AA)   · Lipids: 250ml, Daily  · Rate/Volume: 83.3 mL/hr ordered to start tonight  · Goal PN Orders Provides: PN as ordered will provide 1380 kcal and 50 g pro/day   · Anthropometric Measures:  · Ht: 5' 5\" (165.1 cm)   · Current Body Wt: 105 lb 9.6 oz (47.9 kg)  · Admission Body Wt: 105 lb 9.6 oz (47.9 kg)  · Ideal Body Wt: 125 lb (56.7 kg), % Ideal Body 84%  · BMI Classification: BMI <18.5 Underweight    Nutrition Interventions:   Continue NPO. Parenteral Nutrition ordered to start tonight by MD.  Continued Inpatient Monitoring, Education Not Indicated    Nutrition Evaluation:   · Evaluation: Progressing toward goals   · Goals: Meet % of estimated nutrient needs.    · Monitoring: PN Intake, PN Tolerance, Pertinent Labs, Chewing/Swallowing, Nutrition Progression, Weight      Electronically signed by Malathi Marion RD, LD on 11/26/18 at 2:58 PM    Contact Number: [phone]

## 2018-11-27 LAB
ANION GAP SERPL CALCULATED.3IONS-SCNC: 8 MMOL/L (ref 9–17)
BUN BLDV-MCNC: 19 MG/DL (ref 6–20)
BUN/CREAT BLD: ABNORMAL (ref 9–20)
CALCIUM SERPL-MCNC: 8.4 MG/DL (ref 8.6–10.4)
CHLORIDE BLD-SCNC: 107 MMOL/L (ref 98–107)
CO2: 24 MMOL/L (ref 20–31)
CREAT SERPL-MCNC: 0.44 MG/DL (ref 0.5–0.9)
GFR AFRICAN AMERICAN: >60 ML/MIN
GFR NON-AFRICAN AMERICAN: >60 ML/MIN
GFR SERPL CREATININE-BSD FRML MDRD: ABNORMAL ML/MIN/{1.73_M2}
GFR SERPL CREATININE-BSD FRML MDRD: ABNORMAL ML/MIN/{1.73_M2}
GLUCOSE BLD-MCNC: 142 MG/DL (ref 65–105)
GLUCOSE BLD-MCNC: 154 MG/DL (ref 65–105)
GLUCOSE BLD-MCNC: 211 MG/DL (ref 65–105)
GLUCOSE BLD-MCNC: 226 MG/DL (ref 70–99)
GLUCOSE BLD-MCNC: 232 MG/DL (ref 65–105)
POTASSIUM SERPL-SCNC: 4.3 MMOL/L (ref 3.7–5.3)
SODIUM BLD-SCNC: 139 MMOL/L (ref 135–144)

## 2018-11-27 PROCEDURE — 6360000002 HC RX W HCPCS: Performed by: FAMILY MEDICINE

## 2018-11-27 PROCEDURE — 2580000003 HC RX 258: Performed by: FAMILY MEDICINE

## 2018-11-27 PROCEDURE — 2500000003 HC RX 250 WO HCPCS: Performed by: INTERNAL MEDICINE

## 2018-11-27 PROCEDURE — 6370000000 HC RX 637 (ALT 250 FOR IP): Performed by: FAMILY MEDICINE

## 2018-11-27 PROCEDURE — 2500000003 HC RX 250 WO HCPCS: Performed by: FAMILY MEDICINE

## 2018-11-27 PROCEDURE — S0028 INJECTION, FAMOTIDINE, 20 MG: HCPCS | Performed by: INTERNAL MEDICINE

## 2018-11-27 PROCEDURE — 99232 SBSQ HOSP IP/OBS MODERATE 35: CPT | Performed by: FAMILY MEDICINE

## 2018-11-27 PROCEDURE — 80048 BASIC METABOLIC PNL TOTAL CA: CPT

## 2018-11-27 PROCEDURE — 82947 ASSAY GLUCOSE BLOOD QUANT: CPT

## 2018-11-27 PROCEDURE — 6360000002 HC RX W HCPCS: Performed by: INTERNAL MEDICINE

## 2018-11-27 PROCEDURE — 1200000000 HC SEMI PRIVATE

## 2018-11-27 PROCEDURE — 36415 COLL VENOUS BLD VENIPUNCTURE: CPT

## 2018-11-27 PROCEDURE — 92526 ORAL FUNCTION THERAPY: CPT

## 2018-11-27 PROCEDURE — 6360000002 HC RX W HCPCS: Performed by: EMERGENCY MEDICINE

## 2018-11-27 PROCEDURE — 6370000000 HC RX 637 (ALT 250 FOR IP): Performed by: INTERNAL MEDICINE

## 2018-11-27 PROCEDURE — 94762 N-INVAS EAR/PLS OXIMTRY CONT: CPT

## 2018-11-27 PROCEDURE — 99232 SBSQ HOSP IP/OBS MODERATE 35: CPT | Performed by: INTERNAL MEDICINE

## 2018-11-27 RX ORDER — INSULIN GLARGINE 100 [IU]/ML
10 INJECTION, SOLUTION SUBCUTANEOUS DAILY
Status: DISCONTINUED | OUTPATIENT
Start: 2018-11-27 | End: 2018-11-30 | Stop reason: HOSPADM

## 2018-11-27 RX ADMIN — Medication 10 ML: at 10:52

## 2018-11-27 RX ADMIN — INSULIN LISPRO 4 UNITS: 100 INJECTION, SOLUTION INTRAVENOUS; SUBCUTANEOUS at 23:30

## 2018-11-27 RX ADMIN — INSULIN LISPRO 4 UNITS: 100 INJECTION, SOLUTION INTRAVENOUS; SUBCUTANEOUS at 04:00

## 2018-11-27 RX ADMIN — Medication 10 ML: at 10:53

## 2018-11-27 RX ADMIN — INSULIN LISPRO 2 UNITS: 100 INJECTION, SOLUTION INTRAVENOUS; SUBCUTANEOUS at 10:51

## 2018-11-27 RX ADMIN — DEXAMETHASONE SODIUM PHOSPHATE 4 MG: 4 INJECTION, SOLUTION INTRAMUSCULAR; INTRAVENOUS at 23:27

## 2018-11-27 RX ADMIN — DEXAMETHASONE SODIUM PHOSPHATE 4 MG: 4 INJECTION, SOLUTION INTRAMUSCULAR; INTRAVENOUS at 10:05

## 2018-11-27 RX ADMIN — ENOXAPARIN SODIUM 30 MG: 30 INJECTION SUBCUTANEOUS at 10:48

## 2018-11-27 RX ADMIN — FAMOTIDINE 20 MG: 10 INJECTION, SOLUTION INTRAVENOUS at 10:40

## 2018-11-27 RX ADMIN — MAGNESIUM SULFATE HEPTAHYDRATE: 500 INJECTION, SOLUTION INTRAMUSCULAR; INTRAVENOUS at 18:20

## 2018-11-27 RX ADMIN — I.V. FAT EMULSION 250 ML: 20 EMULSION INTRAVENOUS at 18:22

## 2018-11-27 RX ADMIN — INSULIN GLARGINE 10 UNITS: 100 INJECTION, SOLUTION SUBCUTANEOUS at 10:21

## 2018-11-27 RX ADMIN — PIPERACILLIN AND TAZOBACTAM 3.38 G: 3; .375 INJECTION, POWDER, LYOPHILIZED, FOR SOLUTION INTRAVENOUS; PARENTERAL at 20:28

## 2018-11-27 RX ADMIN — DEXAMETHASONE SODIUM PHOSPHATE 4 MG: 4 INJECTION, SOLUTION INTRAMUSCULAR; INTRAVENOUS at 16:36

## 2018-11-27 RX ADMIN — PIPERACILLIN AND TAZOBACTAM 3.38 G: 3; .375 INJECTION, POWDER, LYOPHILIZED, FOR SOLUTION INTRAVENOUS; PARENTERAL at 10:30

## 2018-11-27 RX ADMIN — FAMOTIDINE 20 MG: 10 INJECTION, SOLUTION INTRAVENOUS at 20:28

## 2018-11-27 RX ADMIN — PIPERACILLIN AND TAZOBACTAM 3.38 G: 3; .375 INJECTION, POWDER, LYOPHILIZED, FOR SOLUTION INTRAVENOUS; PARENTERAL at 05:15

## 2018-11-27 RX ADMIN — INSULIN LISPRO 2 UNITS: 100 INJECTION, SOLUTION INTRAVENOUS; SUBCUTANEOUS at 16:00

## 2018-11-27 ASSESSMENT — PAIN DESCRIPTION - ONSET: ONSET: ON-GOING

## 2018-11-27 ASSESSMENT — PAIN DESCRIPTION - PROGRESSION
CLINICAL_PROGRESSION: NOT CHANGED

## 2018-11-27 ASSESSMENT — ENCOUNTER SYMPTOMS
CONSTIPATION: 0
ABDOMINAL PAIN: 0
DIARRHEA: 0
BLOOD IN STOOL: 0
SORE THROAT: 0
TROUBLE SWALLOWING: 1
WHEEZING: 0
SINUS PRESSURE: 0
NAUSEA: 0
VOMITING: 0
SHORTNESS OF BREATH: 0
COUGH: 0
VOICE CHANGE: 1

## 2018-11-27 ASSESSMENT — PAIN DESCRIPTION - LOCATION: LOCATION: THROAT

## 2018-11-27 ASSESSMENT — PAIN DESCRIPTION - PAIN TYPE: TYPE: ACUTE PAIN

## 2018-11-27 ASSESSMENT — ACTIVITIES OF DAILY LIVING (ADL): EFFECT OF PAIN ON DAILY ACTIVITIES: DECREASED COMFORT

## 2018-11-27 ASSESSMENT — PAIN DESCRIPTION - DESCRIPTORS: DESCRIPTORS: SORE

## 2018-11-27 ASSESSMENT — PAIN SCALES - GENERAL: PAINLEVEL_OUTOF10: 1

## 2018-11-27 ASSESSMENT — PAIN DESCRIPTION - FREQUENCY: FREQUENCY: CONTINUOUS

## 2018-11-27 NOTE — FLOWSHEET NOTE
Stopped to see pt while rounding on SICU. · Pt was laying in bed w/ her feet sticking out from the covers when  entered the room. · Pt was open to & accepting of 's presence. · Pt talked about having had a surgery to clean out an abscess \"behind my tonsils. \"  · Pt went on to explain how she had learned what her medical issue was & how doctors had treated it. · Pt told  that she knows that the infection in her throat is serious & that she expects to be in the hospital for a while. · Pt told  that she knows it's a long drive from [address] that she doesn't expect to see much of her  or daughter, or others b/c of their needs to work, etc.  · Pt was open & accepting of 's offer to pray. Chaplains will remain available to offer spiritual and emotional support as needed. Rev. Rudy De Los Santos     11/26/18 1824   Encounter Summary   Services provided to: Patient   Referral/Consult From: Rounding   Support System Spouse; Children;Family members   Place of [facility] None   Continue Visiting (11/26)   Complexity of Encounter Moderate   Length of Encounter 15 minutes   Spiritual Assessment Completed Yes   Routine   Type Initial   Assessment Calm; Approachable;Coping; Hopeful   Intervention Sustaining presence/ Ministry of presence; Active listening;Explored feelings, thoughts, concerns;Explored coping resources; Discussed illness/injury and it's impact; Discussed belief system/Sabianist practices/dimas;Prayer  (left Spiritual Care info)   Outcome Receptive; Acceptance; Coping; Hopeful;Encouraged;Expressed feelings/needs/concerns;Comfort;Expressed gratitude

## 2018-11-27 NOTE — PROGRESS NOTES
Speech Language Pathology  Speech Language Pathology  [address] [facility]    Dysphagia Treatment Note    Date: 11/27/2018  Patients Name: Bebe Bradley  MRN: [id number]  Diagnosis: dysphagia  Patient Active Problem List   Diagnosis Code    Peritonsillar abscess J36    Retropharyngeal abscess J39.0    Acute postoperative respiratory insufficiency J95.89    Pharyngoesophageal dysphagia R13.14       Pain: 0/10    Dysphagia Treatment  Treatment time:0924-0932    Subjective: [x] Alert [x] Cooperative     [] Confused     [] Agitated    [] Lethargic    Objective/Assessment:    Pt. Seen for O/M treatment program.  Pt. Completed O/M exercises X10 X1 set with min verbal/visual cues. Pt. Unable to complete usman maneuver, despite max verbal/visual cues. Pt. Educated on importance of completing exercises daily and reasoning for exercises. Pt. Verbalized understanding. Written exercises left at bedside. Plan:  [x] Continue  services    [] Discharge from :        Discharge recommendations: [] Inpatient Rehab   [] [place]   [] Outpatient Therapy  [] Follow up at trauma clinic   [x] Other: home with intermittent assistance         Treatment completed by: Completed by Marisa Puga,  Clinician    Co-signed by Romelia Smith A.CCC/SLP

## 2018-11-27 NOTE — PROGRESS NOTES
Progression      Electronically signed by Austyn Chadwick RD, LD on 11/27/18 at 4:07 PM    Contact Number: [phone]

## 2018-11-27 NOTE — PLAN OF CARE
Problem: Nutritional:  Goal: Nutritional status will improve  Nutritional status will improve   Outcome: Ongoing      Problem: Falls - Risk of:  Goal: Will remain free from falls  Will remain free from falls   Outcome: Ongoing  Bed in lowest position, locked, call light within reach, SR up x3, nonskid footwear on & bed alarm on. Pt encouraged to use call light before getting out of bed & for any wants/needs. Pt verbalizes understanding. RN to continue to monitor. Problem: Risk for Impaired Skin Integrity  Goal: Tissue integrity - skin and mucous membranes  Structural intactness and normal physiological function of skin and  mucous membranes.    Outcome: Ongoing      Problem: Infection - Central Venous Catheter-Associated Bloodstream Infection:  Goal: Will show no infection signs and symptoms  Will show no infection signs and symptoms   Outcome: Ongoing

## 2018-11-27 NOTE — PROGRESS NOTES
on 11/24/18. Postoperatively she was kept intubated and monitored in ICU . She was extubated on 11/25/18 . Post extubation dysphagia persisted and was having pooling of secreations . Patient failed swallow study on 11/26/18     PMH:  Past Medical History:   Diagnosis Date    Bronchitis 2010    Hearing deficit     bilateral/ uses hearing aids      Allergies: No Known Allergies   Medications :    insulin glargine 10 Units Subcutaneous Daily   dexamethasone 4 mg Intravenous Q8H   lidocaine 1 % injection 5 mL Intradermal Once   sodium chloride flush 10 mL Intravenous 2 times per day   insulin lispro 0-12 Units Subcutaneous Q6H   famotidine (PEPCID) injection 20 mg Intravenous BID   enoxaparin 30 mg Subcutaneous Daily   sodium chloride flush 10 mL Intravenous 2 times per day   piperacillin-tazobactam 3.375 g Intravenous Q8H       Review of Systems   Review of Systems   Constitutional: Negative for activity change, appetite change, chills, fatigue, fever and unexpected weight change. HENT: Positive for trouble swallowing and voice change. Negative for congestion, mouth sores, postnasal drip, sinus pressure and sore throat. Eyes: Negative for visual disturbance. Respiratory: Negative for cough, shortness of breath and wheezing. Cardiovascular: Negative for chest pain and palpitations. Gastrointestinal: Negative for abdominal pain, blood in stool, constipation, diarrhea, nausea and vomiting. Endocrine: Negative for polyuria. Genitourinary: Negative for difficulty urinating, dysuria, frequency and urgency. Musculoskeletal: Negative for arthralgias, joint swelling and myalgias. Neurological: Negative for dizziness, tremors, speech difficulty, light-headedness and headaches.      Objective :      Current Vitals : Temp: 97.3 °F (36.3 °C),  Pulse: 56, Resp: 18, BP: (!) 135/46, SpO2: 94 %  Last 24 Hrs Vitals   Patient Vitals for the past 24 hrs:   BP Temp Temp src Pulse Resp SpO2 Height Weight   11/27/18 0308 (!) 135/46 97.3 °F (36.3 °C) Oral 56 18 94 % 5' 6\" (1.676 m) 115 lb 1.3 oz (52.2 kg)   11/26/18 2000 (!) 135/29 98.2 °F (36.8 °C) Oral (!) 48 20 96 % - -   11/26/18 1900 (!) 145/37 - - 51 22 95 % - -   11/26/18 1800 (!) 139/38 - - (!) 46 15 95 % - -   11/26/18 1700 (!) 131/33 - - (!) 47 15 96 % - -   11/26/18 1600 (!) 134/26 - - 51 17 96 % - -   11/26/18 1500 (!) 126/31 - - 50 18 98 % - -   11/26/18 1400 (!) 128/36 - - 50 19 97 % - -   11/26/18 1300 (!) 140/35 - - (!) 49 17 96 % - -   11/26/18 1200 (!) 135/40 98.7 °F (37.1 °C) Oral (!) 47 13 96 % - -   11/26/18 1100 (!) 114/59 - - (!) 44 17 96 % - -   11/26/18 1000 (!) 142/33 - - (!) 48 20 95 % - -   11/26/18 0900 (!) 142/37 - - 53 - 94 % - -   11/26/18 0800 (!) 142/29 98.5 °F (36.9 °C) Oral 52 18 95 % - -     Intake / output   11/26 0701 - 11/27 0700  In: 2047 [I.V.:500]  Out: 700 [Urine:700]  Physical Exam:  Physical Exam   Constitutional: She is oriented to person, place, and time. She appears well-developed and well-nourished. No distress. HENT:   Mouth/Throat: No oropharyngeal exudate. Eyes: Pupils are equal, round, and reactive to light. Conjunctivae are normal. No scleral icterus. Neck: No JVD present. No thyromegaly present. Cardiovascular: Normal rate, regular rhythm and normal heart sounds. Exam reveals no gallop and no friction rub. No murmur heard. Pulmonary/Chest: Effort normal. No respiratory distress. She has no wheezes. She has no rales. Abdominal: Soft. Bowel sounds are normal. She exhibits no distension and no mass. There is no tenderness. There is no rebound and no guarding. Musculoskeletal:   PICC line in place right upper extremity. Lymphadenopathy:     She has no cervical adenopathy. Neurological: She is alert and oriented to person, place, and time. No cranial nerve deficit. She exhibits normal muscle tone. Skin: She is not diaphoretic. Nursing note and vitals reviewed.     Lower Extremities : No ankle Edema , No calf Tenderness     Laboratory findings:    Recent Labs      11/24/18   1616  11/25/18   0437  11/26/18   0746   WBC  13.0*  9.8  8.8   HGB  12.2  11.2*  11.4*   HCT  37.4  34.5*  34.6*   PLT  217  192  232     Recent Labs      11/25/18   0437  11/26/18   0746  11/27/18   0510   NA  144  144  139   K  4.2  3.7  4.3   CL  111*  114*  107   CO2  24  22  24   GLUCOSE  156*  133*  226*   BUN  22*  24*  19   CREATININE  0.36*  0.44*  0.44*   CALCIUM  8.6  8.3*  8.4*     No results for input(s): PROT, LABALBU, LABA1C, E2FGCXK, Q4XJBQP, FT4, TSH, AST, ALT, LDH, GGT, ALKPHOS, BILITOT, BILIDIR, AMMONIA, AMYLASE, LIPASE, LACTATE, CHOL, HDL, LDLCHOLESTEROL, CHOLHDLRATIO, TRIG, VLDL, BNP, TROPONINI, CKTOTAL, CKMB, CKMBINDEX, RF, RENATA in the last 72 hours. No results found for: [employer], RBCUA, BLOODU, BACTERIA, NITRU, [address], LEUKOCYTESUR  Lab Results   Component Value Date    LABA1C 4.9 11/23/2018       Imaging/Diagonstics:     CT neck 11/22/18   Very  extensive very thick prevertebral heparin abscess. Warm most extent of prevertebral material at the mid thyroid level or so. Though more fluid is present in inferior to the edges sent to the carotid arteries and lower thyroid gland anteriorly. Surgical consultation as indicated. Exact percent drop aforementioned processes unclear. Could be from right tonsillar area. Moderate compromise of hypopharyngeal airway.     CT soft tissue neck 11/23/18   Impression:     Interval increase in size of retropharyngeal abscess, now seen extending  caudally behind the esophagus and superiorly into the right-sided  oropharyngeal wall.        Cultures - retropharyngeal abscess 11/23/18   FEW NEUTROPHILS     Direct Exam  (Abnormal) 11/24/2018  1:45 PM [address] NEGATIVE RODS     Direct Exam  (Abnormal) 11/24/2018  1:45 PM [facility]     Direct Exam  (Abnormal) 11/24/2018  1:45 PM [address]

## 2018-11-28 ENCOUNTER — APPOINTMENT (OUTPATIENT)
Dept: GENERAL RADIOLOGY | Age: 58
DRG: 133 | End: 2018-11-28
Attending: FAMILY MEDICINE
Payer: COMMERCIAL

## 2018-11-28 LAB
ANION GAP SERPL CALCULATED.3IONS-SCNC: 9 MMOL/L (ref 9–17)
BUN BLDV-MCNC: 11 MG/DL (ref 6–20)
BUN/CREAT BLD: ABNORMAL (ref 9–20)
CALCIUM SERPL-MCNC: 8 MG/DL (ref 8.6–10.4)
CHLORIDE BLD-SCNC: 107 MMOL/L (ref 98–107)
CO2: 23 MMOL/L (ref 20–31)
CREAT SERPL-MCNC: 0.39 MG/DL (ref 0.5–0.9)
CULTURE: NORMAL
CULTURE: NORMAL
GFR AFRICAN AMERICAN: >60 ML/MIN
GFR NON-AFRICAN AMERICAN: >60 ML/MIN
GFR SERPL CREATININE-BSD FRML MDRD: ABNORMAL ML/MIN/{1.73_M2}
GFR SERPL CREATININE-BSD FRML MDRD: ABNORMAL ML/MIN/{1.73_M2}
GLUCOSE BLD-MCNC: 138 MG/DL (ref 65–105)
GLUCOSE BLD-MCNC: 143 MG/DL (ref 65–105)
GLUCOSE BLD-MCNC: 153 MG/DL (ref 65–105)
GLUCOSE BLD-MCNC: 214 MG/DL (ref 65–105)
GLUCOSE BLD-MCNC: 229 MG/DL (ref 70–99)
Lab: NORMAL
Lab: NORMAL
POTASSIUM SERPL-SCNC: 4.5 MMOL/L (ref 3.7–5.3)
SODIUM BLD-SCNC: 139 MMOL/L (ref 135–144)
SPECIMEN DESCRIPTION: NORMAL
SPECIMEN DESCRIPTION: NORMAL
STATUS: NORMAL
STATUS: NORMAL

## 2018-11-28 PROCEDURE — 94762 N-INVAS EAR/PLS OXIMTRY CONT: CPT

## 2018-11-28 PROCEDURE — 99232 SBSQ HOSP IP/OBS MODERATE 35: CPT | Performed by: FAMILY MEDICINE

## 2018-11-28 PROCEDURE — G8996 SWALLOW CURRENT STATUS: HCPCS

## 2018-11-28 PROCEDURE — 99232 SBSQ HOSP IP/OBS MODERATE 35: CPT | Performed by: INTERNAL MEDICINE

## 2018-11-28 PROCEDURE — 92526 ORAL FUNCTION THERAPY: CPT

## 2018-11-28 PROCEDURE — 92611 MOTION FLUOROSCOPY/SWALLOW: CPT

## 2018-11-28 PROCEDURE — 2500000003 HC RX 250 WO HCPCS: Performed by: FAMILY MEDICINE

## 2018-11-28 PROCEDURE — 2580000003 HC RX 258: Performed by: FAMILY MEDICINE

## 2018-11-28 PROCEDURE — 1200000000 HC SEMI PRIVATE

## 2018-11-28 PROCEDURE — 82947 ASSAY GLUCOSE BLOOD QUANT: CPT

## 2018-11-28 PROCEDURE — 36415 COLL VENOUS BLD VENIPUNCTURE: CPT

## 2018-11-28 PROCEDURE — 6370000000 HC RX 637 (ALT 250 FOR IP): Performed by: INTERNAL MEDICINE

## 2018-11-28 PROCEDURE — 6360000002 HC RX W HCPCS: Performed by: FAMILY MEDICINE

## 2018-11-28 PROCEDURE — G8997 SWALLOW GOAL STATUS: HCPCS

## 2018-11-28 PROCEDURE — 80048 BASIC METABOLIC PNL TOTAL CA: CPT

## 2018-11-28 PROCEDURE — 2500000003 HC RX 250 WO HCPCS: Performed by: INTERNAL MEDICINE

## 2018-11-28 PROCEDURE — 6360000002 HC RX W HCPCS: Performed by: INTERNAL MEDICINE

## 2018-11-28 PROCEDURE — 6360000002 HC RX W HCPCS: Performed by: EMERGENCY MEDICINE

## 2018-11-28 PROCEDURE — 6370000000 HC RX 637 (ALT 250 FOR IP): Performed by: FAMILY MEDICINE

## 2018-11-28 PROCEDURE — S0028 INJECTION, FAMOTIDINE, 20 MG: HCPCS | Performed by: INTERNAL MEDICINE

## 2018-11-28 PROCEDURE — 74230 X-RAY XM SWLNG FUNCJ C+: CPT

## 2018-11-28 RX ORDER — DEXAMETHASONE SODIUM PHOSPHATE 4 MG/ML
2 INJECTION, SOLUTION INTRA-ARTICULAR; INTRALESIONAL; INTRAMUSCULAR; INTRAVENOUS; SOFT TISSUE EVERY 8 HOURS
Status: DISCONTINUED | OUTPATIENT
Start: 2018-11-28 | End: 2018-11-29

## 2018-11-28 RX ADMIN — DEXAMETHASONE SODIUM PHOSPHATE 2 MG: 4 INJECTION, SOLUTION INTRAMUSCULAR; INTRAVENOUS at 18:06

## 2018-11-28 RX ADMIN — DEXAMETHASONE SODIUM PHOSPHATE 4 MG: 4 INJECTION, SOLUTION INTRAMUSCULAR; INTRAVENOUS at 06:01

## 2018-11-28 RX ADMIN — INSULIN LISPRO 4 UNITS: 100 INJECTION, SOLUTION INTRAVENOUS; SUBCUTANEOUS at 05:11

## 2018-11-28 RX ADMIN — INSULIN LISPRO 2 UNITS: 100 INJECTION, SOLUTION INTRAVENOUS; SUBCUTANEOUS at 10:06

## 2018-11-28 RX ADMIN — Medication 10 ML: at 21:14

## 2018-11-28 RX ADMIN — FAMOTIDINE 20 MG: 10 INJECTION, SOLUTION INTRAVENOUS at 10:06

## 2018-11-28 RX ADMIN — PIPERACILLIN AND TAZOBACTAM 3.38 G: 3; .375 INJECTION, POWDER, LYOPHILIZED, FOR SOLUTION INTRAVENOUS; PARENTERAL at 18:12

## 2018-11-28 RX ADMIN — INSULIN LISPRO 2 UNITS: 100 INJECTION, SOLUTION INTRAVENOUS; SUBCUTANEOUS at 21:32

## 2018-11-28 RX ADMIN — Medication 10 ML: at 10:25

## 2018-11-28 RX ADMIN — I.V. FAT EMULSION 250 ML: 20 EMULSION INTRAVENOUS at 18:11

## 2018-11-28 RX ADMIN — Medication 10 ML: at 10:05

## 2018-11-28 RX ADMIN — PIPERACILLIN AND TAZOBACTAM 3.38 G: 3; .375 INJECTION, POWDER, LYOPHILIZED, FOR SOLUTION INTRAVENOUS; PARENTERAL at 03:27

## 2018-11-28 RX ADMIN — FAMOTIDINE 20 MG: 10 INJECTION, SOLUTION INTRAVENOUS at 21:13

## 2018-11-28 RX ADMIN — DEXAMETHASONE SODIUM PHOSPHATE 2 MG: 4 INJECTION, SOLUTION INTRAMUSCULAR; INTRAVENOUS at 21:16

## 2018-11-28 RX ADMIN — ENOXAPARIN SODIUM 30 MG: 30 INJECTION SUBCUTANEOUS at 10:06

## 2018-11-28 RX ADMIN — PIPERACILLIN AND TAZOBACTAM 3.38 G: 3; .375 INJECTION, POWDER, LYOPHILIZED, FOR SOLUTION INTRAVENOUS; PARENTERAL at 10:06

## 2018-11-28 RX ADMIN — INSULIN GLARGINE 10 UNITS: 100 INJECTION, SOLUTION SUBCUTANEOUS at 10:07

## 2018-11-28 RX ADMIN — MAGNESIUM SULFATE HEPTAHYDRATE: 500 INJECTION, SOLUTION INTRAMUSCULAR; INTRAVENOUS at 18:11

## 2018-11-28 ASSESSMENT — ENCOUNTER SYMPTOMS
RESPIRATORY NEGATIVE: 1
ALLERGIC/IMMUNOLOGIC NEGATIVE: 1
WHEEZING: 0
GASTROINTESTINAL NEGATIVE: 1
TROUBLE SWALLOWING: 1
BLOOD IN STOOL: 0
CHEST TIGHTNESS: 0
SHORTNESS OF BREATH: 0
VOMITING: 0
ABDOMINAL PAIN: 0
DIARRHEA: 0
NAUSEA: 0
VOICE CHANGE: 1
CONSTIPATION: 0
SINUS PRESSURE: 0
SORE THROAT: 0
EYES NEGATIVE: 1
COUGH: 0

## 2018-11-28 ASSESSMENT — PAIN SCALES - GENERAL
PAINLEVEL_OUTOF10: 0

## 2018-11-28 NOTE — PROGRESS NOTES
Smoking Cessation - topics covered   []  Health Risks  []  Benefits of Quitting   []  Smoking Cessation  []  Patient has no history of tobacco use  []  Patient is former smoker. []  No need for tobacco cessation education. []  Booklet given  [x]  Patient verbalizes understanding. [x]  Patient denies need for tobacco cessation education.   Rosendo Sánchez  3:21 PM

## 2018-11-28 NOTE — PROGRESS NOTES
Oral 54 16 -   11/28/18 0027 (!) 139/52 97.7 °F (36.5 °C) Oral 53 16 94 %   11/27/18 2035 (!) 135/51 97.7 °F (36.5 °C) Oral 55 16 95 %   11/27/18 1200 (!) 128/40 96.8 °F (36 °C) Oral 56 - -   11/27/18 1000 (!) 133/44 97.3 °F (36.3 °C) Oral 53 16 -     Intake / output   11/27 0701 - 11/28 0700  In: 1216 [I.V.:170]  Out: 400 [Urine:400]  Physical Exam:  Physical Exam   Constitutional: She is oriented to person, place, and time. She appears well-developed and well-nourished. No distress. HENT:   Mouth/Throat: No oropharyngeal exudate. Eyes: Pupils are equal, round, and reactive to light. Conjunctivae are normal. No scleral icterus. Neck: No JVD present. No thyromegaly present. Cardiovascular: Normal rate, regular rhythm and normal heart sounds. Exam reveals no gallop and no friction rub. No murmur heard. Pulmonary/Chest: Effort normal. No respiratory distress. She has no wheezes. She has no rales. Abdominal: Soft. Bowel sounds are normal. She exhibits no distension and no mass. There is no tenderness. There is no rebound and no guarding. Musculoskeletal:   PICC line in place right upper extremity. Lymphadenopathy:     She has no cervical adenopathy. Neurological: She is alert and oriented to person, place, and time. No cranial nerve deficit. She exhibits normal muscle tone. Skin: She is not diaphoretic. Nursing note and vitals reviewed.     Lower Extremities : No ankle Edema , No calf Tenderness     Laboratory findings:    Recent Labs      11/26/18   0746   WBC  8.8   HGB  11.4*   HCT  34.6*   PLT  232     Recent Labs      11/26/18   0746  11/27/18   0510  11/28/18   0457   NA  144  139  139   K  3.7  4.3  4.5   CL  114*  107  107   CO2  22  24  23   GLUCOSE  133*  226*  229*   BUN  24*  19  11   CREATININE  0.44*  0.44*  0.39*   CALCIUM  8.3*  8.4*  8.0*     No results for input(s): PROT, LABALBU, LABA1C, E2NPHNW, E6IXURR, FT4, TSH, AST, ALT, LDH, GGT, ALKPHOS, BILITOT, BILIDIR, AMMONIA,

## 2018-11-28 NOTE — PROCEDURES
INSTRUMENTAL SWALLOW REPORT  MODIFIED BARIUM SWALLOW    NAME: Spenser Carrillo   : 1960  MRN: [id number]       Date of Eval: 2018              Referring Diagnosis(es):      Past Medical History:  has a past medical history of Bronchitis and Hearing deficit. Past Surgical History:  has a past surgical history that includes pr inc/drain peritonsil abscess (N/A, 2018) and hc  picc powerpicc double (2018). Current Diet Solid Consistency: NPO  Current Diet Liquid Consistency: NPO       Type of Study: Repeat MBS         Patient Complaints/Reason for Referral:  Spenser Carrillo was referred for a MBS to assess the efficiency of his/her swallow function, assess for aspiration, and to make recommendations regarding safe dietary consistencies, effective compensatory strategies, and safe eating environment. Onset of problem:     The patient is a 66 y.o.  Non-/non  female who presents with No chief complaint on file.   and she is admitted to the hospital for the management of  Peritonsillar abscess. Patient was transferred from [facility] where she presented with 5 day history of sore throat. Patient started to have sore throat about 5 days ago and was taken to urgent care where she was treated with amoxicillin and sent home.  Throat swab was negative for strep.  Patient continued to have worsening of symptoms and had difficulty in swallowing for last 3 days. Radha Lundberg noticed change in voice and was unable to speak for last 2 days.  Patient also had neck pain which was worse on moving neck for last 1 day. Evaluation and [facility] showed peritonsillar abscess which has been extending inferiorly down to the neck. Patient has no past medical history. Radha Lundberg has not seen any primary care physician for last 3 years. Elderjericho Vermakike does not take any regular medications at home.       Behavior/Cognition/Vision/Hearing:  Behavior/Cognition: Alert; Cooperative  Vision: Impaired    Impressions:  Thin: + penetration to the cords after swallow mild-mod stasis with + throat clear, chin tuck did not decreased penetration. Puree:  + penetration to cords after swallow from mild-moderate vallec and pyrform stasis with + throat clear. Increased risk for aspiration with all consistencies due to penetration     Patient Position: Lateral and Patient Degrees: 90      Consistencies Administered: Thin cup; Thin teaspoon;Puree    Recommended Diet:  Solid consistency: NPO  Liquid consistency: NPO       Medication administration: Via NG/PEG      Recommendations/Treatment  Requires SLP Intervention: Yes        D/C Recommendations: Outpatient;Home independently    Recommended Exercises:    Therapeutic Interventions: Laryngeal exercises; Pharyngeal exercises; Tongue base strengthening    Education: Images and recommendations were reviewed with pt following this exam.   Patient Education: yes  Patient Education Response: Verbalizes understanding    Prognosis  Prognosis for safe diet advancement: good      Goals:    Long Term:     To Maximize safety with intake, optimize nutrition/hydration and minimize risk for aspiration. Short Term:     Goal 1: Continue O/M treatment program      Oral Preparation / Oral Phase  Oral Phase: WFL  Oral Phase: A-P transit and oral manipulation functional for consistencies tested    Pharyngeal Phase  Pharyngeal Phase: Impaired    Pharyngeal: Thin: + penetration to the cords after swallow mild-mod stasis with + throat clear, chin tuck did not decreased penetration. Puree:  + penetration to cords after swallow from mild-moderate vallec and pyrform stasis with + throat clear    Dysphagia Outcome Severity Scale: Level 1: Severe dysphagia- NPO.  Unable to tolerate any PO safely  Penetration-Aspiration Scale (PAS): 4 - Material enters the airway, contacts the vocal folds, and is ejected from the airway    Esophageal Phase  Esophageal Screen:

## 2018-11-29 LAB
ANION GAP SERPL CALCULATED.3IONS-SCNC: 9 MMOL/L (ref 9–17)
BUN BLDV-MCNC: 12 MG/DL (ref 6–20)
BUN/CREAT BLD: ABNORMAL (ref 9–20)
CALCIUM SERPL-MCNC: 8.3 MG/DL (ref 8.6–10.4)
CHLORIDE BLD-SCNC: 105 MMOL/L (ref 98–107)
CO2: 22 MMOL/L (ref 20–31)
CREAT SERPL-MCNC: 0.42 MG/DL (ref 0.5–0.9)
GFR AFRICAN AMERICAN: >60 ML/MIN
GFR NON-AFRICAN AMERICAN: >60 ML/MIN
GFR SERPL CREATININE-BSD FRML MDRD: ABNORMAL ML/MIN/{1.73_M2}
GFR SERPL CREATININE-BSD FRML MDRD: ABNORMAL ML/MIN/{1.73_M2}
GLUCOSE BLD-MCNC: 137 MG/DL (ref 65–105)
GLUCOSE BLD-MCNC: 138 MG/DL (ref 65–105)
GLUCOSE BLD-MCNC: 141 MG/DL (ref 65–105)
GLUCOSE BLD-MCNC: 163 MG/DL (ref 65–105)
GLUCOSE BLD-MCNC: 192 MG/DL (ref 70–99)
POTASSIUM SERPL-SCNC: 4.6 MMOL/L (ref 3.7–5.3)
SODIUM BLD-SCNC: 136 MMOL/L (ref 135–144)

## 2018-11-29 PROCEDURE — 6360000002 HC RX W HCPCS: Performed by: EMERGENCY MEDICINE

## 2018-11-29 PROCEDURE — 99232 SBSQ HOSP IP/OBS MODERATE 35: CPT | Performed by: INTERNAL MEDICINE

## 2018-11-29 PROCEDURE — S0028 INJECTION, FAMOTIDINE, 20 MG: HCPCS | Performed by: INTERNAL MEDICINE

## 2018-11-29 PROCEDURE — 6360000002 HC RX W HCPCS: Performed by: FAMILY MEDICINE

## 2018-11-29 PROCEDURE — 92526 ORAL FUNCTION THERAPY: CPT

## 2018-11-29 PROCEDURE — 6370000000 HC RX 637 (ALT 250 FOR IP): Performed by: FAMILY MEDICINE

## 2018-11-29 PROCEDURE — 2580000003 HC RX 258: Performed by: FAMILY MEDICINE

## 2018-11-29 PROCEDURE — 1200000000 HC SEMI PRIVATE

## 2018-11-29 PROCEDURE — 99239 HOSP IP/OBS DSCHRG MGMT >30: CPT | Performed by: FAMILY MEDICINE

## 2018-11-29 PROCEDURE — 36415 COLL VENOUS BLD VENIPUNCTURE: CPT

## 2018-11-29 PROCEDURE — 6370000000 HC RX 637 (ALT 250 FOR IP): Performed by: INTERNAL MEDICINE

## 2018-11-29 PROCEDURE — 2500000003 HC RX 250 WO HCPCS: Performed by: FAMILY MEDICINE

## 2018-11-29 PROCEDURE — 82947 ASSAY GLUCOSE BLOOD QUANT: CPT

## 2018-11-29 PROCEDURE — 80048 BASIC METABOLIC PNL TOTAL CA: CPT

## 2018-11-29 PROCEDURE — 2580000003 HC RX 258: Performed by: OTOLARYNGOLOGY

## 2018-11-29 PROCEDURE — 2500000003 HC RX 250 WO HCPCS: Performed by: INTERNAL MEDICINE

## 2018-11-29 RX ORDER — DEXAMETHASONE SODIUM PHOSPHATE 4 MG/ML
2 INJECTION, SOLUTION INTRA-ARTICULAR; INTRALESIONAL; INTRAMUSCULAR; INTRAVENOUS; SOFT TISSUE EVERY 12 HOURS
Status: DISCONTINUED | OUTPATIENT
Start: 2018-11-30 | End: 2018-11-30 | Stop reason: HOSPADM

## 2018-11-29 RX ORDER — DEXAMETHASONE SODIUM PHOSPHATE 4 MG/ML
2 INJECTION, SOLUTION INTRA-ARTICULAR; INTRALESIONAL; INTRAMUSCULAR; INTRAVENOUS; SOFT TISSUE EVERY 12 HOURS
Qty: 2 ML | Refills: 0 | Status: SHIPPED | OUTPATIENT
Start: 2018-11-29 | End: 2018-11-30 | Stop reason: HOSPADM

## 2018-11-29 RX ADMIN — Medication 10 ML: at 20:31

## 2018-11-29 RX ADMIN — INSULIN LISPRO 2 UNITS: 100 INJECTION, SOLUTION INTRAVENOUS; SUBCUTANEOUS at 10:04

## 2018-11-29 RX ADMIN — I.V. FAT EMULSION 250 ML: 20 EMULSION INTRAVENOUS at 18:35

## 2018-11-29 RX ADMIN — PIPERACILLIN AND TAZOBACTAM 3.38 G: 3; .375 INJECTION, POWDER, LYOPHILIZED, FOR SOLUTION INTRAVENOUS; PARENTERAL at 03:01

## 2018-11-29 RX ADMIN — MAGNESIUM SULFATE HEPTAHYDRATE: 500 INJECTION, SOLUTION INTRAMUSCULAR; INTRAVENOUS at 18:35

## 2018-11-29 RX ADMIN — Medication 10 ML: at 10:03

## 2018-11-29 RX ADMIN — DEXAMETHASONE SODIUM PHOSPHATE 2 MG: 4 INJECTION, SOLUTION INTRAMUSCULAR; INTRAVENOUS at 15:20

## 2018-11-29 RX ADMIN — INSULIN GLARGINE 10 UNITS: 100 INJECTION, SOLUTION SUBCUTANEOUS at 10:04

## 2018-11-29 RX ADMIN — ENOXAPARIN SODIUM 30 MG: 30 INJECTION SUBCUTANEOUS at 10:03

## 2018-11-29 RX ADMIN — SODIUM CHLORIDE: 9 INJECTION, SOLUTION INTRAVENOUS at 10:05

## 2018-11-29 RX ADMIN — PIPERACILLIN AND TAZOBACTAM 3.38 G: 3; .375 INJECTION, POWDER, LYOPHILIZED, FOR SOLUTION INTRAVENOUS; PARENTERAL at 20:00

## 2018-11-29 RX ADMIN — DEXAMETHASONE SODIUM PHOSPHATE 2 MG: 4 INJECTION, SOLUTION INTRAMUSCULAR; INTRAVENOUS at 05:51

## 2018-11-29 RX ADMIN — INSULIN LISPRO 2 UNITS: 100 INJECTION, SOLUTION INTRAVENOUS; SUBCUTANEOUS at 05:48

## 2018-11-29 RX ADMIN — FAMOTIDINE 20 MG: 10 INJECTION, SOLUTION INTRAVENOUS at 20:31

## 2018-11-29 RX ADMIN — FAMOTIDINE 20 MG: 10 INJECTION, SOLUTION INTRAVENOUS at 10:03

## 2018-11-29 RX ADMIN — PIPERACILLIN AND TAZOBACTAM 3.38 G: 3; .375 INJECTION, POWDER, LYOPHILIZED, FOR SOLUTION INTRAVENOUS; PARENTERAL at 12:20

## 2018-11-29 ASSESSMENT — PAIN SCALES - GENERAL
PAINLEVEL_OUTOF10: 0

## 2018-11-29 ASSESSMENT — ENCOUNTER SYMPTOMS
BLOOD IN STOOL: 0
EYES NEGATIVE: 1
CONSTIPATION: 0
VOMITING: 0
RESPIRATORY NEGATIVE: 1
SINUS PRESSURE: 0
CHEST TIGHTNESS: 0
TROUBLE SWALLOWING: 1
WHEEZING: 0
SORE THROAT: 0
SHORTNESS OF BREATH: 0
VOICE CHANGE: 1
ABDOMINAL PAIN: 0
COUGH: 0
ALLERGIC/IMMUNOLOGIC NEGATIVE: 1
DIARRHEA: 0
NAUSEA: 0
GASTROINTESTINAL NEGATIVE: 1

## 2018-11-29 NOTE — PLAN OF CARE
Problem: Nutritional:  Goal: Nutritional status will improve  Nutritional status will improve   Outcome: Ongoing  CURRENTLY ON TPN  Intervention: Monitor nutritional status  Ongoing. Problem: Physical Regulation:  Goal: Diagnostic test results will improve  Diagnostic test results will improve   Outcome: Ongoing  Swallowing improved, but did not past eval yesterday.  Remains NPO

## 2018-11-29 NOTE — PROGRESS NOTES
Nutrition Assessment (Parenteral Nutrition)    Type and Reason for Visit: Reassess    Nutrition Recommendations: Continue Parenteral Nutrition, Modify current Parenteral Nutrition-suggest increase Na and Ca, and decrease K in next bag. Recommend hold off on addition of insulin for now d/t tapering of steroids. Monitor labs and modify PN as needed. Suggest check Mg, Phos, and Trig levels tomorrow morning. Nutrition Assessment: S/p video swallow study yesterday- ST recommends NPO. TPN continues-noted plan for d/c on PN. Per RN, may not d/c today so will need another bag of TPN ordered for tonight. States MD questioning need for insulin in PN at home. Last BS was 141 mg/dL. Noted plan to taper and stop steroids. Nutrition Risk Level: High    Nutrient Needs:  · Estimated Daily Total Kcal: 6525-4913 kcal/day  · Estimated Daily Protein (g): 70-85 g/day    Nutrition Diagnosis:   · Problem: Inadequate oral intake  · Etiology: related to Difficulty swallowing     Signs and symptoms:  as evidenced by NPO status due to medical condition, need for nutrition support-PN    Objective Information:  · Current Nutrition Therapies:  · Oral Diet Orders: NPO   · Parenteral Nutrition Orders:  · Type and Formula: 2-in-1 Custom (200g dextrose, 70 g AA)   · Lipids: 250ml, Daily  · Rate/Volume: 60ml per hr  · Duration: Continuous  · PN Orders Provides: 1460 kcal, 70 g protein per day  · Anthropometric Measures:  · Ht: 5' 6\" (167.6 cm)   · Current Body Wt: 104 lb 4.4 oz (47.3 kg)  · Admission Body Wt: 105 lb 9.6 oz (47.9 kg)  · Ideal Body Wt: 125 lb (56.7 kg), % Ideal Body 83%  · BMI Classification: BMI <18.5 Underweight    Nutrition Interventions:   Continue Parenteral Nutrition, Modify current Parenteral Nutrition-suggest increase Na and Ca, and decrease K in next bag. Recommend hold off on addition of insulin for now d/t tapering of steroids.    Continued Inpatient Monitoring    Nutrition Evaluation:   · Evaluation: Goal

## 2018-11-29 NOTE — DISCHARGE INSTR - COC
documented pain score (0-10 scale): Pain Level: 0  Last Weight:   Wt Readings from Last 1 Encounters:   11/29/18 104 lb 4.4 oz (47.3 kg)     Mental Status:  oriented, alert and logical    IV Access:  - PICC - site  R Upper Arm, insertion date: 11-26-18    Nursing Mobility/ADLs:  Walking   Independent  Transfer  Independent  Bathing  Independent  Dressing  Independent  Toileting  Independent  Feeding  Independent  Med Admin  Assisted  Med Delivery   all IV meds- strict NPO    Wound Care Documentation and Therapy: none        Elimination:  Continence:   · Bowel: Yes  · Bladder: Yes  Urinary Catheter: None   Colostomy/Ileostomy/Ileal Conduit: No       Date of Last BM: 11-29-18    Intake/Output Summary (Last 24 hours) at 11/29/18 1057  Last data filed at 11/29/18 0600   Gross per 24 hour   Intake          1912.15 ml   Output             2250 ml   Net          -337.85 ml     I/O last 3 completed shifts: In: 2874.2 [I.V.:424; IV Piggyback:50]  Out: 4415 [Urine:3450]    Safety Concerns: At Risk for Falls and Aspiration Risk    Impairments/Disabilities:      None    Nutrition Therapy:  Current Nutrition Therapy:   - Oral Diet:  NPO    Routes of Feeding: Parenteral Nutrition (PN)  Liquids: No Liquids  Daily Fluid Restriction: no  Last Modified Barium Swallow with Video (Video Swallowing Test): 11-28-18 (failed)    Treatments at the Time of Hospital Discharge:   Respiratory Treatments: none  Oxygen Therapy:  is not on home oxygen therapy.   Ventilator:    - No ventilator support    Rehab Therapies: Speech/Language Therapy  Weight Bearing Status/Restrictions: No weight bearing restirctions  Other Medical Equipment (for information only, NOT a DME order):  IV therapy  Other Treatments: ***    Patient's personal belongings (please select all that are sent with patient):  Glasses    RN SIGNATURE:  Electronically signed by Katerin Perez RN on 11/29/18 at 1:22 PM    CASE MANAGEMENT/SOCIAL WORK SECTION    Inpatient Status Date: ***    Readmission Risk Assessment Score:  Readmission Risk              Risk of Unplanned Readmission:        11           Discharging to Facility/ Agency   · Name: [facility]  · Address:  · Phone:  · Fax:    Dialysis Facility (if applicable)   · Name:  · Address:  · Dialysis Schedule:  · Phone:  · Fax:    / signature: Electronically signed by Prem Wang RN on 11/30/18 at 2:12 PM    PHYSICIAN SECTION    Prognosis: Good    Condition at Discharge: Stable    Rehab Potential (if transferring to Rehab): Good    Recommended Labs or Other Treatments After Discharge: follow up with Dr Martha Hart in 7 days . Follow up with Dr Agueda Rodas in 10 days     Physician Certification: I certify the above information and transfer of Gema King  is necessary for the continuing treatment of the diagnosis listed and that she requires Home Care for less 30 days.      Update Admission H&P: No change in H&P    PHYSICIAN SIGNATURE:  Electronically signed by Aly Esposito MD on 11/29/18 at 10:58 AM

## 2018-11-29 NOTE — PROGRESS NOTES
[facility] Note  admission date 11/22/2018  Impression :   Current:  · Retropharyngeal abscess per CT of the neck - drained 11/24/18, mixed anaerobic harriet  ·  resolved leukocytosis   Discussion / summary of stay / plan of care   ·  progressive nasopharyngeal hematoma, status post drainage 11/24/18, was due to laryngeal edema. · Cultures showing mixed  ANAEROBES, NOT IDENTIFIED. IMPROVING ON ZOSYN  · Failed bedside swallow study x 2  Recommendations     · She is on Zosyn - plan on keeping it 14  More days till 12/13/18  · On decadron for  the laryngeal edema still  · Has been Nothing by mouth until swallowing secured, TPN, to continue at discharge  · picc in place  · Disc w RN and pt  · See me office in 2 weeks     Infection Control Recommendations   · Victor Precautions     Antimicrobial Stewardship Recommendations   · Simplification of therapy  · Targeted therapy     Coordination ofOutpatient Care:   · Estimated Length of IV antimicrobials:  · Patient will need Midline / picc Catheter Insertion:   · Patient will need SNF:  · Patient will need outpatient wound care:   Chief complaint/reason for consultation:   Sore throat-peritonsillar abscess     History of Present Illness:   Initial history:  Duane Rector is a 66y.o.-year-old female presents with history of a sore throat ongoing for about 5 days prior to admission. She went initially to [facility], from where she was transferred to . It seems she was given initially amoxicillin - The throat swab was negative for Streptococcus in ER . Her symptoms started getting worse and she couldn't swallow about 3 days ago. She also noticed a change in her voice was not able to speak. Soreness in her neck brought her in to [facility] with persistent and worsening of her symptoms.   A CT of the soft tissues with contrast showed over there at peritonsillar abscess extending inferiorly down to the neck. Transfer to [address], Dr. Grisel Das from ENT saw her, possibly planning a drainage if she does not improve. So far she has a slight improvement on her antibiotics. She has received Zosyn, dexamethasone and vancomycin since admission. No fever since admission and the blood cultures are negative. WBC today is 15. We are consulted for management and antibiotics     Interval agscxjo2011/23/2018   No fever and failed swallow study again and is able today to swallow her saliva better but NPO for food  No nausea, vomiting, diarrhea    cx from the pharyngeal pus cx mixed anaerobes  Blood cx from [facility] x 1 of 2 blood cx - contamination    Summary of relevant labs:  Labs:   W13 -9.8  Creat  . 0.39  Micro:   retro pharyngeal pus cx mixed w clusters - normal harriet    Imaging:  CT neck 11/23/18  Interval increase in size of retropharyngeal abscess, now seen extending   caudally behind the esophagus and superiorly into the right-sided   oropharyngeal wall.               I have personally reviewed the past medical history, past surgical history, medications, social history, and family history, and I haveupdated the database accordingly.   Past Medical History:     Past Medical History:   Diagnosis Date    Bronchitis 2010    Hearing deficit     bilateral/ uses hearing aids       Past Surgical  History:     Past Surgical History:   Procedure Laterality Date    [facility]  PICC [facility] DOUBLE  11/26/2018         NM INC/DRAIN PERITONSIL ABSCESS N/A 11/24/2018    RETROPHARYNGEAL  ABSCESS INCISION AND DRAINAGE, DIRECT LARYNGOSCOPY performed by Sita Hoffman MD at [place] [address]       Medications:      dexamethasone  2 mg Intravenous Q8H    insulin glargine  10 Units Subcutaneous Daily    sodium chloride flush  10 mL Intravenous 2 times per day    insulin lispro  0-12 Units Subcutaneous Q6H    famotidine (PEPCID) injection  20 mg Intravenous BID    enoxaparin  30 mg Subcutaneous Daily    respiratory distress. She has no wheezes. Abdominal: She exhibits no distension. There is no tenderness. There is no guarding. Genitourinary: No vaginal discharge found. Genitourinary Comments: Urine clear   Musculoskeletal: She exhibits no edema or deformity. Neurological: She is alert and oriented to person, place, and time. No cranial nerve deficit. Coordination normal.   Skin: Skin is warm and dry. Psychiatric: She has a normal mood and affect. Her behavior is normal.         Medical Decision Making:   I have independently reviewed/ordered the following labs:    CBC with Differential:   No results for input(s): WBC, HGB, HCT, PLT, SEGSPCT, BANDSPCT, LYMPHOPCT, MONOPCT, EOSPCT in the last 72 hours. BMP:  Recent Labs      11/28/18   0457  11/29/18   0449   NA  139  136   K  4.5  4.6   CL  107  105   CO2  23  22   BUN  11  12   CREATININE  0.39*  0.42*     Hepatic Function Panel:   No results for input(s): PROT, LABALBU, BILIDIR, IBILI, BILITOT, ALKPHOS, ALT, AST in the last 72 hours. No results for input(s): RPR in the last 72 hours. No results for input(s): HIV in the last 72 hours. No results for input(s): BC in the last 72 hours. Lab Results   Component Value Date    CREATININE 0.42 11/29/2018    GLUCOSE 192 11/29/2018       Detailed results: Thank you for allowing us to participate in the care of this patient. Please call with questions. This note is created with the assistance of a speech recognition program.  While intending to generate adocument that actually reflects the content of the visit, the document can still have some errors including those of syntax and sound a like substitutions which may escape proof reading. It such instances, actual meaningcan be extrapolated by contextual diversion.     Alissa Akins MD  Office: [phone]

## 2018-11-29 NOTE — PROGRESS NOTES
PULMONARY PROGRESS NOTE      Patient:  Bonita Crum  YOB: 1960    MRN: [id number]     Acct: [de-identified]     Admit date: 11/22/2018    REASON FOR CONSULT:- Retropharyngeal abscess with acute hypoxemic respiratory failure status post extubation    Pt seen and Chart reviewed. No acute events   Dysphagia   Review of Systems -  General ROS: negative for - chills, fatigue, fever or weight loss  Cardiovascular ROS: no chest pain , orthopnea or pnd   Gastrointestinal ROS: no abdominal pain, change in bowel habits, or black or bloody stools  Skin - no rash   Neuro - no blurry vision , no loc . No focal weakness   msk - no jt tenderness or swelling    Vascular - no claudication , rest completed and negative   Lymphatic - complete and negative   Hematology - oncology - complete and negative   Allergy immunology - complete and negative    no burning or hematuria          Diet:  Diet NPO Effective Now Exceptions are:  Other (See Comment)  PN-Adult 2-in-1 Central Line (Standard)      Medications:Current Inpatient    Scheduled Meds:   dexamethasone  2 mg Intravenous Q8H    insulin glargine  10 Units Subcutaneous Daily    lidocaine 1 % injection  5 mL Intradermal Once    sodium chloride flush  10 mL Intravenous 2 times per day    insulin lispro  0-12 Units Subcutaneous Q6H    famotidine (PEPCID) injection  20 mg Intravenous BID    enoxaparin  30 mg Subcutaneous Daily    sodium chloride flush  10 mL Intravenous 2 times per day    piperacillin-tazobactam  3.375 g Intravenous Q8H     Continuous Infusions:   PN-Adult 2-in-1 Central Line (Standard) 60 mL/hr at 11/28/18 1811    fat emulsion Stopped (11/29/18 0700)    sodium chloride 10 mL/hr at 11/26/18 1929    dextrose       PRN Meds:sodium chloride flush, oxyCODONE **OR** oxyCODONE, glucose, dextrose, glucagon (rDNA), dextrose, sodium chloride flush, hours. CULTURES:          Assessment:    Principal Problem:    Retropharyngeal abscess  Active Problems:    Peritonsillar abscess    Acute postoperative respiratory insufficiency    Pharyngoesophageal dysphagia  Resolved Problems:    * No resolved hospital problems.  *      Plan:  Continue antibiotics   Will sign off    Michael Tyler MD      11/29/2018, 10:52 AM    Pulmonary & Critical Care

## 2018-11-30 VITALS
TEMPERATURE: 97.9 F | HEART RATE: 75 BPM | OXYGEN SATURATION: 95 % | BODY MASS INDEX: 15.77 KG/M2 | WEIGHT: 98.11 LBS | RESPIRATION RATE: 16 BRPM | DIASTOLIC BLOOD PRESSURE: 42 MMHG | SYSTOLIC BLOOD PRESSURE: 94 MMHG | HEIGHT: 66 IN

## 2018-11-30 LAB
ANION GAP SERPL CALCULATED.3IONS-SCNC: 10 MMOL/L (ref 9–17)
BUN BLDV-MCNC: 14 MG/DL (ref 6–20)
BUN/CREAT BLD: ABNORMAL (ref 9–20)
CALCIUM SERPL-MCNC: 8.2 MG/DL (ref 8.6–10.4)
CHLORIDE BLD-SCNC: 105 MMOL/L (ref 98–107)
CO2: 24 MMOL/L (ref 20–31)
CREAT SERPL-MCNC: 0.41 MG/DL (ref 0.5–0.9)
CULTURE: ABNORMAL
DIRECT EXAM: ABNORMAL
GFR AFRICAN AMERICAN: >60 ML/MIN
GFR NON-AFRICAN AMERICAN: >60 ML/MIN
GFR SERPL CREATININE-BSD FRML MDRD: ABNORMAL ML/MIN/{1.73_M2}
GFR SERPL CREATININE-BSD FRML MDRD: ABNORMAL ML/MIN/{1.73_M2}
GLUCOSE BLD-MCNC: 109 MG/DL (ref 65–105)
GLUCOSE BLD-MCNC: 118 MG/DL (ref 65–105)
GLUCOSE BLD-MCNC: 119 MG/DL (ref 70–99)
GLUCOSE BLD-MCNC: 151 MG/DL (ref 65–105)
Lab: ABNORMAL
MAGNESIUM: 2.3 MG/DL (ref 1.6–2.6)
PHOSPHORUS: 4 MG/DL (ref 2.6–4.5)
POTASSIUM SERPL-SCNC: 4.3 MMOL/L (ref 3.7–5.3)
SODIUM BLD-SCNC: 139 MMOL/L (ref 135–144)
SPECIMEN DESCRIPTION: ABNORMAL
STATUS: ABNORMAL
TRIGL SERPL-MCNC: 110 MG/DL

## 2018-11-30 PROCEDURE — 84478 ASSAY OF TRIGLYCERIDES: CPT

## 2018-11-30 PROCEDURE — 6360000002 HC RX W HCPCS: Performed by: EMERGENCY MEDICINE

## 2018-11-30 PROCEDURE — 80048 BASIC METABOLIC PNL TOTAL CA: CPT

## 2018-11-30 PROCEDURE — 84100 ASSAY OF PHOSPHORUS: CPT

## 2018-11-30 PROCEDURE — 6370000000 HC RX 637 (ALT 250 FOR IP): Performed by: FAMILY MEDICINE

## 2018-11-30 PROCEDURE — 6360000002 HC RX W HCPCS: Performed by: FAMILY MEDICINE

## 2018-11-30 PROCEDURE — 6370000000 HC RX 637 (ALT 250 FOR IP): Performed by: INTERNAL MEDICINE

## 2018-11-30 PROCEDURE — 94761 N-INVAS EAR/PLS OXIMETRY MLT: CPT

## 2018-11-30 PROCEDURE — S0028 INJECTION, FAMOTIDINE, 20 MG: HCPCS | Performed by: INTERNAL MEDICINE

## 2018-11-30 PROCEDURE — 2500000003 HC RX 250 WO HCPCS: Performed by: INTERNAL MEDICINE

## 2018-11-30 PROCEDURE — 82947 ASSAY GLUCOSE BLOOD QUANT: CPT

## 2018-11-30 PROCEDURE — 2580000003 HC RX 258: Performed by: FAMILY MEDICINE

## 2018-11-30 PROCEDURE — 92526 ORAL FUNCTION THERAPY: CPT

## 2018-11-30 PROCEDURE — 36415 COLL VENOUS BLD VENIPUNCTURE: CPT

## 2018-11-30 PROCEDURE — 83735 ASSAY OF MAGNESIUM: CPT

## 2018-11-30 PROCEDURE — 99239 HOSP IP/OBS DSCHRG MGMT >30: CPT | Performed by: FAMILY MEDICINE

## 2018-11-30 RX ADMIN — Medication 10 ML: at 10:12

## 2018-11-30 RX ADMIN — ENOXAPARIN SODIUM 30 MG: 30 INJECTION SUBCUTANEOUS at 10:12

## 2018-11-30 RX ADMIN — PIPERACILLIN AND TAZOBACTAM 3.38 G: 3; .375 INJECTION, POWDER, LYOPHILIZED, FOR SOLUTION INTRAVENOUS; PARENTERAL at 02:49

## 2018-11-30 RX ADMIN — DEXAMETHASONE SODIUM PHOSPHATE 2 MG: 4 INJECTION, SOLUTION INTRAMUSCULAR; INTRAVENOUS at 05:23

## 2018-11-30 RX ADMIN — PIPERACILLIN AND TAZOBACTAM 3.38 G: 3; .375 INJECTION, POWDER, LYOPHILIZED, FOR SOLUTION INTRAVENOUS; PARENTERAL at 13:51

## 2018-11-30 RX ADMIN — INSULIN LISPRO 2 UNITS: 100 INJECTION, SOLUTION INTRAVENOUS; SUBCUTANEOUS at 10:15

## 2018-11-30 RX ADMIN — INSULIN GLARGINE 10 UNITS: 100 INJECTION, SOLUTION SUBCUTANEOUS at 10:15

## 2018-11-30 RX ADMIN — FAMOTIDINE 20 MG: 10 INJECTION, SOLUTION INTRAVENOUS at 10:12

## 2018-11-30 ASSESSMENT — ENCOUNTER SYMPTOMS
COUGH: 0
VOICE CHANGE: 1
ABDOMINAL PAIN: 0
BLOOD IN STOOL: 0
DIARRHEA: 0
NAUSEA: 0
VOMITING: 0
WHEEZING: 0
CONSTIPATION: 0
SINUS PRESSURE: 0
TROUBLE SWALLOWING: 1
SORE THROAT: 0
SHORTNESS OF BREATH: 0

## 2018-11-30 ASSESSMENT — PAIN SCALES - GENERAL
PAINLEVEL_OUTOF10: 0

## 2018-11-30 NOTE — DISCHARGE SUMMARY
[address] [facility]      Discharge Summary     Patient ID: Chaka Panda  :  1960   MRN: [id number]     ACCOUNT:  [de-identified]   Patient Location : [facility]  Patient's PCP: Robert Xie DO  Admit Date: 2018   Discharge Date: 2018     Length of Stay: 8  Code Status:  Full Code  Admitting Physician: Jaci Weir MD  Discharge Physician: Jaci Weir MD     Active Discharge Diagnosis :     Primary Problem  Retropharyngeal abscess      Hospital Problems  Active Hospital Problems    Diagnosis Date Noted    Leukocytosis [D72.829]     Pharyngoesophageal dysphagia [R13.14] 2018    Acute postoperative respiratory insufficiency [J95.89]     Retropharyngeal abscess [J39.0]     Peritonsillar abscess [J36] 2018   Moderate malnutrition due to poor oral intake and underweight. Admission Condition:  poor     Discharged Condition: stable    Hospital Stay:     Hospital Course:  Chaka Panda is a 66 y.o. female who was admitted for the management of   Retropharyngeal abscess. Patient was transferred from [facility] where she presented with 5 day history of sore throat. Patient started to have sore throat about 5 days ago and was taken to urgent care where she was treated with amoxicillin and sent home.  Throat swab was negative for strep.  Patient continued to have worsening of symptoms and had difficulty in swallowing for last 3 days. Mariana Jeffries noticed change in voice and was unable to speak for last 2 days.  Patient also had neck pain which was worse on moving neck for last 1 day. Evaluation and [facility] showed peritonsillar abscess which has been extending inferiorly down to the neck. Patient has no past medical history. Mariana Jeffries has not seen any primary care physician for last 3 years. Mariana Jeffries does not take any regular medications at home. Patient was started on IV Decadron, IV vancomycin, IV Zosyn. ENT was consulted.   And unclear.  Could be from right tonsillar area. Moderate compromise of hypopharyngeal airway.     CT soft tissue neck 11/23/18   Impression:     Interval increase in size of retropharyngeal abscess, now seen extending  caudally behind the esophagus and superiorly into the right-sided  oropharyngeal wall.         Cultures - retropharyngeal abscess 11/23/18        FEW NEUTROPHILS     Direct Exam  (Abnormal) 11/24/2018  1:45 PM [address] NEGATIVE RODS     Direct Exam  (Abnormal) 11/24/2018  1:45 PM [facility]     Direct Exam  (Abnormal) 11/24/2018  1:45 PM [facility] [place] [facility] [place]            Consultations:    Consults:     Final Specialist Recommendations/Findings:   PHARMACY TO DOSE VANCOMYCIN  IP CONSULT TO OTOLARYNGOLOGY  IP CONSULT TO INFECTIOUS DISEASES  IP CONSULT TO CRITICAL CARE  PHARMACY TO DOSE VANCOMYCIN  IP CONSULT TO PHARMACY      The patient was seen and examined on day of discharge and this discharge summary is in conjunction with any daily progress note from day of discharge. Discharge plan:     Disposition: Home with [address] [place] [address]    Physician Follow Up:     Rafa Rodriguez, [facility]  [address] [place] [address]  [phone]    Schedule an appointment as soon as possible for a visit on 12/4/2018  please follow up with Nurse Practioner Scarlet Martel.  Appt is on 12/4 at 1:30pm.      Musa Aquino MD  [address] [place] [address]  [phone]    Schedule an appointment as soon as possible for a visit in 2 weeks  follow up with infectious disease specialists in 2 weeks    Maranda Steve MD  [address] [facility]  [phone]    Schedule an appointment as soon as possible for a visit in 10 days  follow up with ENT (ear/nose/throat) surgeon in 10 days as instructed    [address]  [address] [facility]

## 2018-11-30 NOTE — PROGRESS NOTES
Speech Language Pathology  Speech Language Pathology  [facility]    Dysphagia Treatment Note    Date: 11/30/2018  Patients Name: Chaka Panda  MRN: [id number]  Diagnosis: dysphagia  Patient Active Problem List   Diagnosis Code    Peritonsillar abscess J36    Retropharyngeal abscess J39.0    Acute postoperative respiratory insufficiency J95.89    Pharyngoesophageal dysphagia R13.14    Leukocytosis D72.829       Pain: 0/10    Dysphagia Treatment  Treatment time: 0910 - 0920    Subjective: [x] Alert [x] Cooperative     [] Confused     [] Agitated    [] Lethargic    Objective/Assessment:    Pt. Seen for O/M treatment program.  Pt. Completed O/M exercises X10 X2 sets with min verbal/visual cues. Pt. Completed usman maneuver X3 reps with min verbal/visual cues. Pt. Reports that she is going home today with home care. Pt. Stated that she will continue to complete exercises daily.        Plan:  [x] Continue ST services    [] Discharge from ST:        Discharge recommendations: [] Inpatient Rehab   [] [place]   [] Outpatient Therapy  [] Follow up at trauma clinic   [x] Other: home with intermittent assistance          Treatment completed by: Lita Huitron,

## 2018-12-04 ENCOUNTER — TELEPHONE (OUTPATIENT)
Dept: INFECTIOUS DISEASES | Age: 58
End: 2018-12-04

## 2018-12-07 LAB
MICROBIOLOGY SEND OUT REPORT: NORMAL
TEST NAME: NORMAL

## 2018-12-21 ENCOUNTER — OFFICE VISIT (OUTPATIENT)
Dept: INFECTIOUS DISEASES | Age: 58
End: 2018-12-21
Payer: COMMERCIAL

## 2018-12-21 VITALS
SYSTOLIC BLOOD PRESSURE: 130 MMHG | DIASTOLIC BLOOD PRESSURE: 64 MMHG | HEART RATE: 121 BPM | BODY MASS INDEX: 16.66 KG/M2 | WEIGHT: 100 LBS | TEMPERATURE: 97.8 F | HEIGHT: 65 IN

## 2018-12-21 DIAGNOSIS — J39.0 RETROPHARYNGEAL ABSCESS: Primary | ICD-10-CM

## 2018-12-21 PROCEDURE — 99214 OFFICE O/P EST MOD 30 MIN: CPT | Performed by: INTERNAL MEDICINE

## 2019-01-20 ASSESSMENT — ENCOUNTER SYMPTOMS
ALLERGIC/IMMUNOLOGIC NEGATIVE: 1
EYES NEGATIVE: 1
RESPIRATORY NEGATIVE: 1

## 2023-10-09 NOTE — PROGRESS NOTES
PSA ordered.    Pharmacy Vancomycin Consult     Vancomycin Day: 5  Current Dosin mg IV Q12H    Temp max:  afebrile    Recent Labs      18   0437  18   0746   BUN  22*  24*       Recent Labs      18   0437  18   0746   CREATININE  0.36*  0.44*       Recent Labs      18   0437  18   0746   WBC  9.8  8.8         Intake/Output Summary (Last 24 hours) at 18 0941  Last data filed at 18 0800   Gross per 24 hour   Intake 2841 ml   Output 1000 ml   Net 1841 ml       Culture Date      Source                       Results  18               Blood x2                     Pending  18               MRSA Swab              Negative  18               Abscess                     Pending    Ht Readings from Last 1 Encounters:   18 5' 5\" (1.651 m)        Wt Readings from Last 1 Encounters:   18 105 lb 9.6 oz (47.9 kg)         Body mass index is 17.57 kg/m². Estimated Creatinine Clearance: 108 mL/min (A) (based on SCr of 0.44 mg/dL (L)). Trough: 10.6 mcg/mL    Assessment/Plan:  Trough within desired range, will continue current regimen at this time.     Orquidea FrancisD BCPS  2018 9:41 AM

## (undated) DEVICE — CONTAINER,SPECIMEN,4OZ,OR STRL: Brand: MEDLINE

## (undated) DEVICE — COUNTER NDL 10 COUNT HLD 20 FOAM BLK SGL MAG

## (undated) DEVICE — ELECTROSURGICAL PENCIL BUTTON SWITCH E-Z CLEAN COATED BLADE ELECTRODE 10 FT (3 M) CORD HOLSTER: Brand: MEGADYNE

## (undated) DEVICE — CULTURETTE AERO/ANEROBIC RED/BLUE BUNDLE

## (undated) DEVICE — COAGULATOR SUCT 10FR L6IN HND FT SWCH VALLEYLAB

## (undated) DEVICE — CODMAN® SURGICAL PATTIES 1/2" X 1/2" (1.27CM X 1.27CM): Brand: CODMAN®

## (undated) DEVICE — CODMAN® SURGICAL PATTIES 1/4" X 1/4" (0.64CM X 0.64CM): Brand: CODMAN®

## (undated) DEVICE — CONTROL SYRINGE LUER-LOCK TIP: Brand: MONOJECT

## (undated) DEVICE — PACK PROCEDURE SURG T

## (undated) DEVICE — TUBING, SUCTION, 9/32" X 20', STRAIGHT: Brand: MEDLINE INDUSTRIES, INC.

## (undated) DEVICE — TOWEL,OR,DSP,ST,NATURAL,DLX,4/PK,20PK/CS: Brand: MEDLINE

## (undated) DEVICE — CATHETER,URETHRAL,REDRUBBER,STRL,10FR: Brand: MEDLINE INDUSTRIES, INC.

## (undated) DEVICE — TUBING SUCT 12FR MAL ALUM SHFT FN CAP VENT UNIV CONN W/ OBT

## (undated) DEVICE — GOWN,AURORA,NONREINFORCED,LARGE: Brand: MEDLINE

## (undated) DEVICE — GARMENT,MEDLINE,DVT,INT,CALF,MED, GEN2: Brand: MEDLINE

## (undated) DEVICE — E-Z CLEAN, NON-STICK, PTFE COATED, ELECTROSURGICAL BLADE ELECTRODE, MODIFIED EXTENDED INSULATION, 2.5 INCH (6.35 CM): Brand: MEGADYNE

## (undated) DEVICE — NEEDLE SPNL 18GA L3.5IN W/ QNCKE SHARPER BVL DURA CLICK

## (undated) DEVICE — COVER,MAYO STAND,STERILE: Brand: MEDLINE

## (undated) DEVICE — DUP USE 291175 PAD GROUNDING ADULT 10FT CORD